# Patient Record
Sex: FEMALE | Race: WHITE | Employment: OTHER | URBAN - METROPOLITAN AREA
[De-identification: names, ages, dates, MRNs, and addresses within clinical notes are randomized per-mention and may not be internally consistent; named-entity substitution may affect disease eponyms.]

---

## 2018-01-29 PROBLEM — I82.5Z3 CHRONIC DEEP VEIN THROMBOSIS (DVT) OF DISTAL VEIN OF BOTH LOWER EXTREMITIES (HCC): Status: ACTIVE | Noted: 2018-01-29

## 2018-03-15 ENCOUNTER — HOSPITAL ENCOUNTER (OUTPATIENT)
Dept: CT IMAGING | Age: 78
Discharge: HOME | End: 2018-03-15
Attending: NURSE PRACTITIONER
Payer: MEDICARE

## 2018-03-15 DIAGNOSIS — R05.9 COUGH: ICD-10-CM

## 2018-03-15 DIAGNOSIS — R53.1 WEAKNESS: ICD-10-CM

## 2018-03-15 DIAGNOSIS — J18.9 PNEUMONIA OF RIGHT LOWER LOBE DUE TO INFECTIOUS ORGANISM: ICD-10-CM

## 2018-03-15 DIAGNOSIS — R21 RASH: ICD-10-CM

## 2018-03-15 DIAGNOSIS — Z85.3 HISTORY OF RIGHT BREAST CANCER: ICD-10-CM

## 2018-03-15 DIAGNOSIS — E87.1 HYPONATREMIA: ICD-10-CM

## 2018-03-15 LAB — CREAT BLD-MCNC: 0.6 MG/DL (ref 0.8–1.5)

## 2018-03-15 PROCEDURE — 71260 CT THORAX DX C+: CPT

## 2018-03-15 PROCEDURE — 74011000258 HC RX REV CODE- 258

## 2018-03-15 PROCEDURE — 74011636320 HC RX REV CODE- 636/320

## 2018-03-15 PROCEDURE — 82565 ASSAY OF CREATININE: CPT

## 2018-03-15 RX ORDER — SODIUM CHLORIDE 0.9 % (FLUSH) 0.9 %
10 SYRINGE (ML) INJECTION
Status: COMPLETED | OUTPATIENT
Start: 2018-03-15 | End: 2018-03-15

## 2018-03-15 RX ADMIN — Medication 10 ML: at 14:49

## 2018-03-15 RX ADMIN — SODIUM CHLORIDE 100 ML: 900 INJECTION, SOLUTION INTRAVENOUS at 14:49

## 2018-03-15 RX ADMIN — IOPAMIDOL 100 ML: 755 INJECTION, SOLUTION INTRAVENOUS at 14:49

## 2018-03-16 NOTE — PROGRESS NOTES
No pulmonary embolism. Bilateral infiltrates and cardiomegaly - fluid overload vs. CHF vs. Pneumonia. On Levaquin. Recheck vital signs and CBC/CMP tomorrow; BNP as well. Released to [address]

## 2018-07-30 PROBLEM — Z85.3 HISTORY OF LEFT BREAST CANCER: Status: ACTIVE | Noted: 2018-07-30

## 2018-08-01 ENCOUNTER — HOSPITAL ENCOUNTER (OUTPATIENT)
Dept: LAB | Age: 78
Discharge: HOME | End: 2018-08-01
Attending: INTERNAL MEDICINE
Payer: MEDICARE

## 2018-08-01 DIAGNOSIS — R00.2 PALPITATION: ICD-10-CM

## 2018-08-01 DIAGNOSIS — I47.1 SVT (SUPRAVENTRICULAR TACHYCARDIA) (HCC): ICD-10-CM

## 2018-08-01 LAB
ANION GAP SERPL CALC-SCNC: 5 MMOL/L
BASOPHILS # BLD: 0 K/UL (ref 0–0.2)
BASOPHILS NFR BLD: 1 % (ref 0–2)
BUN SERPL-MCNC: 15 MG/DL (ref 8–23)
CALCIUM SERPL-MCNC: 9 MG/DL (ref 8.3–10.4)
CHLORIDE SERPL-SCNC: 106 MMOL/L (ref 98–107)
CO2 SERPL-SCNC: 32 MMOL/L (ref 21–32)
CREAT SERPL-MCNC: 0.7 MG/DL (ref 0.6–1)
DIFFERENTIAL METHOD BLD: ABNORMAL
EOSINOPHIL # BLD: 0.2 K/UL (ref 0–0.8)
EOSINOPHIL NFR BLD: 3 % (ref 0.5–7.8)
ERYTHROCYTE [DISTWIDTH] IN BLOOD BY AUTOMATED COUNT: 14.1 % (ref 11.9–14.6)
GLUCOSE SERPL-MCNC: 97 MG/DL (ref 65–100)
HCT VFR BLD AUTO: 44.5 % (ref 35.8–46.3)
HGB BLD-MCNC: 14.5 G/DL (ref 11.7–15.4)
LYMPHOCYTES # BLD: 1.8 K/UL (ref 0.5–4.6)
LYMPHOCYTES NFR BLD: 28 % (ref 13–44)
MAGNESIUM SERPL-MCNC: 2.5 MG/DL (ref 1.8–2.4)
MCH RBC QN AUTO: 32.4 PG (ref 26.1–32.9)
MCHC RBC AUTO-ENTMCNC: 32.6 G/DL (ref 31.4–35)
MCV RBC AUTO: 99.3 FL (ref 79.6–97.8)
MONOCYTES # BLD: 0.6 K/UL (ref 0.1–1.3)
MONOCYTES NFR BLD: 9 % (ref 4–12)
NEUTS SEG # BLD: 3.8 K/UL (ref 1.7–8.2)
NEUTS SEG NFR BLD: 59 % (ref 43–78)
PLATELET # BLD AUTO: 167 K/UL (ref 150–450)
PMV BLD AUTO: 10.7 FL (ref 10.8–14.1)
POTASSIUM SERPL-SCNC: 4.1 MMOL/L (ref 3.5–5.1)
RBC # BLD AUTO: 4.48 M/UL (ref 4.05–5.25)
SODIUM SERPL-SCNC: 143 MMOL/L (ref 136–145)
WBC # BLD AUTO: 6.4 K/UL (ref 4.3–11.1)

## 2018-08-01 PROCEDURE — 36415 COLL VENOUS BLD VENIPUNCTURE: CPT | Performed by: INTERNAL MEDICINE

## 2018-08-01 PROCEDURE — 85025 COMPLETE CBC W/AUTO DIFF WBC: CPT | Performed by: INTERNAL MEDICINE

## 2018-08-01 PROCEDURE — 80048 BASIC METABOLIC PNL TOTAL CA: CPT | Performed by: INTERNAL MEDICINE

## 2018-08-01 PROCEDURE — 83735 ASSAY OF MAGNESIUM: CPT | Performed by: INTERNAL MEDICINE

## 2018-08-07 NOTE — PROGRESS NOTES
Called to pre-assess for EPS - SVT ablation with Dr Afshan Potter , Scheduled 8/8/18. No answer & message left.

## 2018-08-07 NOTE — PROGRESS NOTES
Patient pre-assessment complete for EPS- SVT ablation with DR Marion Lock scheduled for 18 at 12:30pm, arrival time 10:30am. Patient verified using . Patient instructed to bring all home medications in labeled bottles on the day of procedure. NPO status reinforced. Patient instructed to HOLD metoprolol x 1 day (last dose 18). Instructed they can take all other medications excluding vitamins & supplements. Patient verbalizes understanding of all instructions & denies any questions at this time.

## 2018-08-08 ENCOUNTER — ANESTHESIA EVENT (OUTPATIENT)
Dept: SURGERY | Age: 78
End: 2018-08-08
Payer: MEDICARE

## 2018-08-08 ENCOUNTER — APPOINTMENT (OUTPATIENT)
Dept: CARDIAC CATH/INVASIVE PROCEDURES | Age: 78
End: 2018-08-08
Payer: MEDICARE

## 2018-08-08 ENCOUNTER — ANESTHESIA (OUTPATIENT)
Dept: SURGERY | Age: 78
End: 2018-08-08
Payer: MEDICARE

## 2018-08-08 ENCOUNTER — APPOINTMENT (OUTPATIENT)
Dept: CARDIAC CATH/INVASIVE PROCEDURES | Age: 78
End: 2018-08-08
Attending: INTERNAL MEDICINE
Payer: MEDICARE

## 2018-08-08 ENCOUNTER — HOSPITAL ENCOUNTER (OUTPATIENT)
Age: 78
Setting detail: OUTPATIENT SURGERY
Discharge: HOME | End: 2018-08-08
Attending: INTERNAL MEDICINE | Admitting: INTERNAL MEDICINE
Payer: MEDICARE

## 2018-08-08 VITALS
HEART RATE: 82 BPM | RESPIRATION RATE: 21 BRPM | HEIGHT: 66 IN | OXYGEN SATURATION: 98 % | WEIGHT: 153 LBS | TEMPERATURE: 97.7 F | SYSTOLIC BLOOD PRESSURE: 124 MMHG | DIASTOLIC BLOOD PRESSURE: 77 MMHG | BODY MASS INDEX: 24.59 KG/M2

## 2018-08-08 PROBLEM — I47.1 SVT (SUPRAVENTRICULAR TACHYCARDIA) (HCC): Status: ACTIVE | Noted: 2018-08-08

## 2018-08-08 LAB
ANION GAP SERPL CALC-SCNC: 8 MMOL/L (ref 7–16)
ATRIAL RATE: 83 BPM
BUN SERPL-MCNC: 18 MG/DL (ref 8–23)
CALCIUM SERPL-MCNC: 8.9 MG/DL (ref 8.3–10.4)
CALCULATED P AXIS, ECG09: -12 DEGREES
CALCULATED R AXIS, ECG10: -33 DEGREES
CALCULATED T AXIS, ECG11: 10 DEGREES
CHLORIDE SERPL-SCNC: 109 MMOL/L (ref 98–107)
CO2 SERPL-SCNC: 28 MMOL/L (ref 21–32)
CREAT SERPL-MCNC: 0.61 MG/DL (ref 0.6–1)
DIAGNOSIS, 93000: NORMAL
ERYTHROCYTE [DISTWIDTH] IN BLOOD BY AUTOMATED COUNT: 14 %
GLUCOSE SERPL-MCNC: 113 MG/DL (ref 65–100)
HCT VFR BLD AUTO: 44 % (ref 35.8–46.3)
HGB BLD-MCNC: 14.3 G/DL (ref 11.7–15.4)
INR PPP: 1
MAGNESIUM SERPL-MCNC: 2.2 MG/DL (ref 1.8–2.4)
MCH RBC QN AUTO: 31.8 PG (ref 26.1–32.9)
MCHC RBC AUTO-ENTMCNC: 32.5 G/DL (ref 31.4–35)
MCV RBC AUTO: 97.8 FL (ref 79.6–97.8)
NRBC # BLD: 0 K/UL (ref 0–0.2)
P-R INTERVAL, ECG05: 162 MS
PLATELET # BLD AUTO: 148 K/UL (ref 150–450)
PMV BLD AUTO: 12.2 FL (ref 9.4–12.3)
POTASSIUM SERPL-SCNC: 3.8 MMOL/L (ref 3.5–5.1)
PROTHROMBIN TIME: 12.9 SEC (ref 11.5–14.5)
Q-T INTERVAL, ECG07: 390 MS
QRS DURATION, ECG06: 100 MS
QTC CALCULATION (BEZET), ECG08: 458 MS
RBC # BLD AUTO: 4.5 M/UL (ref 4.05–5.2)
SODIUM SERPL-SCNC: 145 MMOL/L (ref 136–145)
VENTRICULAR RATE, ECG03: 83 BPM
WBC # BLD AUTO: 6 K/UL (ref 4.3–11.1)

## 2018-08-08 PROCEDURE — 83735 ASSAY OF MAGNESIUM: CPT

## 2018-08-08 PROCEDURE — 80048 BASIC METABOLIC PNL TOTAL CA: CPT

## 2018-08-08 PROCEDURE — 76060000033 HC ANESTHESIA 1 TO 1.5 HR: Performed by: INTERNAL MEDICINE

## 2018-08-08 PROCEDURE — 77030027107 HC PTCH EXT REF CARTO3 J&J -F

## 2018-08-08 PROCEDURE — 74011250636 HC RX REV CODE- 250/636

## 2018-08-08 PROCEDURE — 93653 COMPRE EP EVAL TX SVT: CPT

## 2018-08-08 PROCEDURE — 77030035291 HC TBNG PMP SMARTABLATE J&J -B

## 2018-08-08 PROCEDURE — C1893 INTRO/SHEATH, FIXED,NON-PEEL: HCPCS

## 2018-08-08 PROCEDURE — C1732 CATH, EP, DIAG/ABL, 3D/VECT: HCPCS

## 2018-08-08 PROCEDURE — 85610 PROTHROMBIN TIME: CPT

## 2018-08-08 PROCEDURE — 93621 COMP EP EVL L PAC&REC C SINS: CPT

## 2018-08-08 PROCEDURE — 93005 ELECTROCARDIOGRAM TRACING: CPT | Performed by: INTERNAL MEDICINE

## 2018-08-08 PROCEDURE — 93613 INTRACARDIAC EPHYS 3D MAPG: CPT

## 2018-08-08 PROCEDURE — 76937 US GUIDE VASCULAR ACCESS: CPT

## 2018-08-08 PROCEDURE — 74011250636 HC RX REV CODE- 250/636: Performed by: ANESTHESIOLOGY

## 2018-08-08 PROCEDURE — C1733 CATH, EP, OTHR THAN COOL-TIP: HCPCS

## 2018-08-08 PROCEDURE — C1894 INTRO/SHEATH, NON-LASER: HCPCS

## 2018-08-08 PROCEDURE — 85027 COMPLETE CBC AUTOMATED: CPT

## 2018-08-08 PROCEDURE — 77030013687 HC GD NDL BARD -B

## 2018-08-08 PROCEDURE — C1730 CATH, EP, 19 OR FEW ELECT: HCPCS

## 2018-08-08 RX ORDER — HYDROMORPHONE HYDROCHLORIDE 2 MG/ML
0.5 INJECTION, SOLUTION INTRAMUSCULAR; INTRAVENOUS; SUBCUTANEOUS
Status: CANCELLED | OUTPATIENT
Start: 2018-08-08

## 2018-08-08 RX ORDER — LIDOCAINE HYDROCHLORIDE 20 MG/ML
INJECTION, SOLUTION EPIDURAL; INFILTRATION; INTRACAUDAL; PERINEURAL AS NEEDED
Status: DISCONTINUED | OUTPATIENT
Start: 2018-08-08 | End: 2018-08-08 | Stop reason: HOSPADM

## 2018-08-08 RX ORDER — MIDAZOLAM HYDROCHLORIDE 1 MG/ML
2 INJECTION, SOLUTION INTRAMUSCULAR; INTRAVENOUS
Status: DISCONTINUED | OUTPATIENT
Start: 2018-08-08 | End: 2018-08-08 | Stop reason: HOSPADM

## 2018-08-08 RX ORDER — SODIUM CHLORIDE 0.9 % (FLUSH) 0.9 %
5-10 SYRINGE (ML) INJECTION EVERY 8 HOURS
Status: DISCONTINUED | OUTPATIENT
Start: 2018-08-08 | End: 2018-08-08 | Stop reason: HOSPADM

## 2018-08-08 RX ORDER — PROPOFOL 10 MG/ML
INJECTION, EMULSION INTRAVENOUS
Status: DISCONTINUED | OUTPATIENT
Start: 2018-08-08 | End: 2018-08-08 | Stop reason: HOSPADM

## 2018-08-08 RX ORDER — SODIUM CHLORIDE, SODIUM LACTATE, POTASSIUM CHLORIDE, CALCIUM CHLORIDE 600; 310; 30; 20 MG/100ML; MG/100ML; MG/100ML; MG/100ML
100 INJECTION, SOLUTION INTRAVENOUS CONTINUOUS
Status: DISCONTINUED | OUTPATIENT
Start: 2018-08-08 | End: 2018-08-08 | Stop reason: HOSPADM

## 2018-08-08 RX ORDER — SODIUM CHLORIDE 0.9 % (FLUSH) 0.9 %
5-10 SYRINGE (ML) INJECTION AS NEEDED
Status: DISCONTINUED | OUTPATIENT
Start: 2018-08-08 | End: 2018-08-08 | Stop reason: HOSPADM

## 2018-08-08 RX ORDER — PROPOFOL 10 MG/ML
INJECTION, EMULSION INTRAVENOUS AS NEEDED
Status: DISCONTINUED | OUTPATIENT
Start: 2018-08-08 | End: 2018-08-08 | Stop reason: HOSPADM

## 2018-08-08 RX ORDER — SODIUM CHLORIDE 0.9 % (FLUSH) 0.9 %
5-10 SYRINGE (ML) INJECTION AS NEEDED
Status: CANCELLED | OUTPATIENT
Start: 2018-08-08

## 2018-08-08 RX ORDER — SODIUM CHLORIDE, SODIUM LACTATE, POTASSIUM CHLORIDE, CALCIUM CHLORIDE 600; 310; 30; 20 MG/100ML; MG/100ML; MG/100ML; MG/100ML
100 INJECTION, SOLUTION INTRAVENOUS CONTINUOUS
Status: CANCELLED | OUTPATIENT
Start: 2018-08-08 | End: 2018-08-08

## 2018-08-08 RX ORDER — LIDOCAINE HYDROCHLORIDE 10 MG/ML
0.3 INJECTION INFILTRATION; PERINEURAL ONCE
Status: DISCONTINUED | OUTPATIENT
Start: 2018-08-08 | End: 2018-08-08 | Stop reason: HOSPADM

## 2018-08-08 RX ORDER — OXYCODONE HYDROCHLORIDE 5 MG/1
10 TABLET ORAL
Status: CANCELLED | OUTPATIENT
Start: 2018-08-08 | End: 2018-08-09

## 2018-08-08 RX ADMIN — LIDOCAINE HYDROCHLORIDE 40 MG: 20 INJECTION, SOLUTION EPIDURAL; INFILTRATION; INTRACAUDAL; PERINEURAL at 11:59

## 2018-08-08 RX ADMIN — PROPOFOL 140 MCG/KG/MIN: 10 INJECTION, EMULSION INTRAVENOUS at 12:03

## 2018-08-08 RX ADMIN — PROPOFOL 50 MG: 10 INJECTION, EMULSION INTRAVENOUS at 11:59

## 2018-08-08 RX ADMIN — PROPOFOL 40 MG: 10 INJECTION, EMULSION INTRAVENOUS at 12:05

## 2018-08-08 RX ADMIN — PROPOFOL 40 MG: 10 INJECTION, EMULSION INTRAVENOUS at 12:00

## 2018-08-08 RX ADMIN — SODIUM CHLORIDE, SODIUM LACTATE, POTASSIUM CHLORIDE, AND CALCIUM CHLORIDE: 600; 310; 30; 20 INJECTION, SOLUTION INTRAVENOUS at 11:51

## 2018-08-08 NOTE — PROGRESS NOTES
Patient received to [address] [place] [address] room # 9  Ambulatory from [facility]. Patient scheduled for EPS today with Dr Perla Enriquez. Procedure reviewed & questions answered, voiced good understanding consent obtained & placed on chart. All medications and medical history reviewed. Will prep patient per orders. Patient & family updated on plan of care. The patient has a fraility score of 3-MANAGING WELL, based on independent of ADLs/ambulation.

## 2018-08-08 NOTE — IP AVS SNAPSHOT
[address] 
[phone] Patient: Noemi Davey MRN: [id number] EGC:[id number]/38/[id number] Discharge Summary 8/8/2018 Noemi Davey MRN[de-identified]  [id number] Admission Information Provider Pager Service Admission Date Expected D/C Date Slava Barahona MD  CARDIAC CATH LAB 8/8/2018 8/8/2018 Actual LOS Patient Class 0 days OUTPATIENT SURGERY Follow-up Information Follow up With Details Comments Contact Info Jocelin Dubois MD   [address] [place] [address] [address]  Pratik Marks 
[phone] My Medications TAKE these medications as instructed Instructions Each Dose to Equal  
 Morning Noon Evening Bedtime  
 anastrozole 1 mg tablet Commonly known as:  ARIMIDEX Your last dose was: Your next dose is: Take 1 Tab by mouth daily. 1 Tab  
    
   
   
   
  
 apixaban 2.5 mg tablet Commonly known as:  Donavon Croterrient Your last dose was: Your next dose is: Take 1 Tab by mouth two (2) times a day. Indications: deep venous thrombosis 2.5 mg  
    
   
   
   
  
 cholecalciferol 1,000 unit tablet Commonly known as:  VITAMIN D3 Your last dose was: Your next dose is: Take  by mouth daily. latanoprost 0.005 % ophthalmic solution Commonly known as:  Paralee Dicker Your last dose was: Your next dose is:    
   
   
 Administer 1 Drop to both eyes nightly. 1 Drop  
    
   
   
   
  
 lutein 20 mg Cap Your last dose was: Your next dose is: Take  by mouth daily. metoprolol tartrate 25 mg tablet Commonly known as:  LOPRESSOR Your last dose was: Your next dose is: Take 1 Tab by mouth two (2) times a day. 25 mg  
    
   
   
   
  
 multivitamin tablet Commonly known as:  ONE A DAY Your last dose was: Your next dose is: Take 1 Tab by mouth daily. 1 Tab  
    
   
   
   
  
 timolol 0.5 % ophthalmic solution Commonly known as:  TIMOPTIC Your last dose was: Your next dose is:    
   
   
 1 Drop two (2) times a day. 1 Drop General Information Please provide this summary of care documentation to your next provider. Allergies Unspecified:  Cephalexin Current Immunizations  Reviewed on 4/23/2018 Name Date Influenza High Dose Vaccine PF 12/1/2017, 9/22/2016 Influenza Vaccine PF 11/6/2015 Pneumococcal Conjugate (PCV-13) 1/29/2018 Pneumococcal Polysaccharide (PPSV-23) 6/21/2009 Td 4/15/2008 Zoster Vaccine, Live 4/15/2008 Discharge Instructions Discharge Instructions Follow-up appointment with Dr. Antoine Pérez on September 14th at 12:45 pm at the [place] office located on [facility]. If you need to reschedule your appointment, please call [facility] Cardiology at [phone]. DISCHARGE INSTRUCTIONS 1. Check puncture site frequently for swelling or bleeding. If there is any bleeding, lie down and apply pressure over the area with a clean towel or washcloth. Notify your doctor for any redness, swelling, drainage, or oozing from the puncture site. Notify your doctor for any fever or chills. 2. If the extremity becomes cold, numb, or painful call Dr. Antoine Pérez at [phone]. 
3. Activity should be limited for the next 48 hours. Climb stairs as little as possible and avoid any stooping, bending, or strenuous activity for 48 hours. No heavy lifting (anything over 10 pounds) for 3 days. 4. You may resume your usual diet. Drink more fluids than usual. 
5. Have a responsible person drive you home and stay with you for at least 24 hours after your heart catheterization/angiography. 6. You may remove bandage from your Right Groin in 24 hours. You may shower in 24 hours. No tub baths, hot tubs, or swimming for 1 week. Do not place any lotions, creams, powders, or ointments over puncture site for 1 week. You may place a clean band-aid over the puncture site each day for 5 days. Change daily. I have read the above instructions and have had the opportunity to ask questions. Patient: ________________________   Date: 8/8/2018 Witness: _______________________   Date: 8/8/2018 Discharge Orders None  
  
` Patient Signature:  ____________________________________________________________ Date:  ____________________________________________________________  
  
 Penelope Marco Provider Signature:  ____________________________________________________________ Date:  ____________________________________________________________ unknown

## 2018-08-08 NOTE — PROGRESS NOTES
Patient up to bedside, vital signs and site stable. Patient ambulated to bathroom without difficulty. Patient voided without difficulty. Vascular site stable. Discharge instructions and home medications reviewed with patient. Time allowed for questions and answers. 1720 Patient ambulated second time without difficulty. Site stable after ambulation. Peripheral IV sites dc'd without difficulty with tips intact. [phone] Patient discharged to home with family.

## 2018-08-08 NOTE — PROGRESS NOTES
Report received from [facility]. Procedural findings communicated. Intra procedural  medication administration reviewed. Progression of care discussed.      Patient received into [id number] [facility] 5 post sheath removal.     Access site without bleeding or swelling yes    Dressing dry and intact yes    Patient instructed to limit movement to right lower extremity    Routine post procedural vital signs and site assessment initiated yes

## 2018-08-08 NOTE — ANESTHESIA PREPROCEDURE EVALUATION
Anesthetic History   No history of anesthetic complications            Review of Systems / Medical History  Patient summary reviewed and pertinent labs reviewed    Pulmonary  Within defined limits                 Neuro/Psych   Within defined limits           Cardiovascular            Dysrhythmias : SVT      Exercise tolerance: >4 METS  Comments:  On eliquis for DVTs, echo with normal EF in 2013   GI/Hepatic/Renal  Within defined limits              Endo/Other  Within defined limits           Other Findings            Physical Exam    Airway  Mallampati: II  TM Distance: < 4 cm  Neck ROM: normal range of motion, short neck   Mouth opening: Normal     Cardiovascular    Rhythm: regular  Rate: normal         Dental  No notable dental hx       Pulmonary  Breath sounds clear to auscultation               Abdominal         Other Findings            Anesthetic Plan    ASA: 2  Anesthesia type: total IV anesthesia          Induction: Intravenous  Anesthetic plan and risks discussed with: Patient

## 2018-08-08 NOTE — DISCHARGE INSTRUCTIONS
Follow-up appointment with Dr. Harper Velázquez on September 14th at 12:45 pm at the [place] office located on [facility]. If you need to reschedule your appointment, please call [facility] Cardiology at [phone]. DISCHARGE INSTRUCTIONS    1. Check puncture site frequently for swelling or bleeding. If there is any bleeding, lie down and apply pressure over the area with a clean towel or washcloth. Notify your doctor for any redness, swelling, drainage, or oozing from the puncture site. Notify your doctor for any fever or chills. 2. If the extremity becomes cold, numb, or painful call Dr. Harper Velázquez at [phone].  3. Activity should be limited for the next 48 hours. Climb stairs as little as possible and avoid any stooping, bending, or strenuous activity for 48 hours. No heavy lifting (anything over 10 pounds) for 3 days. 4. You may resume your usual diet. Drink more fluids than usual.  5. Have a responsible person drive you home and stay with you for at least 24 hours after your heart catheterization/angiography. 6. You may remove bandage from your Right Groin in 24 hours. You may shower in 24 hours. No tub baths, hot tubs, or swimming for 1 week. Do not place any lotions, creams, powders, or ointments over puncture site for 1 week. You may place a clean band-aid over the puncture site each day for 5 days. Change daily. I have read the above instructions and have had the opportunity to ask questions.       Patient: ________________________   Date: 8/8/2018    Witness: _______________________   Date: 8/8/2018

## 2018-08-08 NOTE — ANESTHESIA POSTPROCEDURE EVALUATION
Post-Anesthesia Evaluation and Assessment    Patient: Noah Pierce MRN: [id number]  SSN: [id number]    YOB: 1940  Age: 69 y.o. Sex: female       Cardiovascular Function/Vital Signs  Visit Vitals    /70    Pulse 79    Temp 36.5 °C (97.7 °F)    Resp 23    Ht 5' 6\" (1.676 m)    Wt 69.4 kg (153 lb)    SpO2 100%    Breastfeeding No    BMI 24.69 kg/m2       Patient is status post total IV anesthesia anesthesia for Procedure(s):  EP STUDY / SVT  ABLATION. Nausea/Vomiting: None    Postoperative hydration reviewed and adequate. Pain:  Pain Scale 1: Numeric (0 - 10) (08/08/18 1123)  Pain Intensity 1: 0 (08/08/18 1123)   Managed    Neurological Status: At baseline    Mental Status and Level of Consciousness: Arousable    Pulmonary Status:   O2 Device: Room air (08/08/18 1320)   Adequate oxygenation and airway patent    Complications related to anesthesia: None    Post-anesthesia assessment completed.  No concerns    Signed By: Cong Adan MD     August 8, 2018

## 2018-08-08 NOTE — PROCEDURES
Pre-Electrophysiology Diagnosis  1. Supraventricular tachycardia     Procedure Performed  1. Comprehensive EP Study  2. Ablation of a supraventricular tachycardia  3. Left atrial pacing recording from the coronary sinus. 4. 3-D Electroanatomical mapping    Anesthesia: MAC     Estimated Blood Loss: Less than 10 mL     Specimens: * No specimens in log *     Procedure: The patient was brought to the electrophysiology lab in the fasting state. The patient was then prepped and draped in sterile fashion. 1% local sensorcaine was infiltrated into the subcutaneous tissues in the right inguinal crease overlying the right femoral vein. Venous access was then obtained x3 using modified Seldinger technique under ultrasound guidance with placement of two 8Fr sheaths, and an 7Fr short sidearm sheath. A decapolar CS catheter was inserted via the 8Fr sheath and positioned in the mid coronary sinus. A quadripolar cathters were inserted via the 7Fr sheath and positioned in the RV and His position. A comprehensive EP study was performed with attempted arrhythmia induction (see measurements of intra-cardiac conduction and induced SVT description below). Post ablation, His recordings were again obtained and attempted induction of arrhythmia was performed with burst CS pacing and single and double extra stimuli. Tachycardia description and diagnostics: At baseline, with PES the patient consistently went into a narrow complex SVT with a short VA time of 36 msec with a TCL of 360 ms. The SVT was induced with a very short VA time of about 36 msec and with ventricular entrainment produced an V-A-H-V response with a long PPI interval consistent and diagnostic of AVNRT. CARTO was used to create an impedence map of the anatomical region of the slow pathway, including the His and ostium of the coronary sinus.   RF energy was delivered in sinus rhythm in the region of the slow pathway with a small atrial and larger ventricular signal with several beats of a slow junctional rhythm. Post ablation, on isuprel aggressive burst pacing and PES were unable to re-induce the clinical SVT. In addition, with 1:1 AV pacing the patient did not cross over to the slow pathway consistently developing Wencheback prior to cross. With PES, there was no jump to the slow pathway or echo beats post ablation. Baseline Intervals    QRS duration: 86 msec  IN interval: 176 msec  RR interval: 584 msec  AH interval: 77 msec  HV interval: 49 msec    EP Study    AV Wenchebach: 340 msec  AV tab ERP: 300 msec  VA Wen[place]bach: 350 msec    Tachycardia Cycle Length: 360 msec    At the completion of the final comprehensive EP study, all catheters were removed after placement of a figure 8 stitch, and sheaths pulled. The patient tolerated the procedure well with no acute complications recognized. Post Procedure Diagnosis: successful ablation of AVNRT    Summary:   1. Comprehensive EP study with induction of an SVT. 2. Successful ablation of ANVRT. 3. Pt tolerated the procedure well. 4. Family updated. Sissy Alan MD, [address]  Clinical Cardiac Electrophysiology  8/8/2018  1:02 PM

## 2018-08-08 NOTE — H&P
[facility] Cardiology/Electrophyiology Consult                Date of  Admission: 8/8/2018 10:14 AM       CC/Reason for admission: EPS and SVT    Debi Ritchie is a 69 y.o. female admitted for Antidromic reciprocating tachycardia ([facility] [address] [I47.1]. 69 y.o. female with a past medical and cardiac history significant for recently diagnosed SVT and presents for EPS. Pt reports she has had years of frequent rapid palpitations usually abrupt onset and offset worsened with activity lasting up to several hours with symptoms of SOB and fatigue. PT SVT is a new diagnosis, no aggravating or alleviating factors, with symptoms as noted above.      Cardiac PMH: (Old records have been reviewed and summarized below)  Monitor (5/20/18): SVT, rate 205    Patient Active Problem List   Diagnosis Code    History of right breast cancer Z85.3    Glaucoma H40.9    Abnormal EKG R94.31    Chronic deep vein thrombosis (DVT) of distal vein of both lower extremities (HCC) I82.5Z3    History of left breast cancer Z85.3    SVT (supraventricular tachycardia) ([facility]) I47.1       Past Medical History:   Diagnosis Date    Arrhythmia     Arthritis     Cancer ([facility] [address]     breast cancer x 2    Diverticulosis     DVT of lower extremity (deep venous thrombosis) ([facility] [address]     left post op 1996    Glaucoma     History of right breast cancer     Hx of breast cancer 2016    left    Hx of echocardiogram 06/28/2103    mild MVP,mild LAE, EF of 58%    Osteopenia       Past Surgical History:   Procedure Laterality Date    HX BREAST BIOPSY Left 08/02/2016    left breast cancer-grade 1 invasive ductal cancer    HX COLONOSCOPY  05/07/2009    Dr. Hannah Carrillo- Diverticulosis and rectal polyp    HX HERNIA REPAIR Right     inguinal    HX MASTECTOMY Right 09/2016     Allergies   Allergen Reactions    Cephalexin Rash      Family History   Problem Relation Age of Onset    Breast Cancer Mother 28    Cancer Father 76    Heart Disease Paternal Grandmother  Diabetes Maternal Grandmother     Prostate Cancer Brother     Cancer Sister      thyroid    Breast Cancer Paternal Aunt     Cancer Paternal Aunt      stomach        Current Facility-Administered Medications   Medication Dose Route Frequency    lidocaine (XYLOCAINE) 10 mg/mL (1 %) injection 0.3 mL  0.3 mL SubCUTAneous ONCE    lactated Ringers infusion  100 mL/hr IntraVENous CONTINUOUS    sodium chloride (NS) flush 5-10 mL  5-10 mL IntraVENous Q8H    sodium chloride (NS) flush 5-10 mL  5-10 mL IntraVENous PRN    midazolam (VERSED) injection 2 mg  2 mg IntraVENous ONCE PRN       Review of Symptoms:  A comprehensive ROS was performed with the pertinent positives and negatives mentioned in the HPI, all other systems reviewed and are negative       Physical Exam  Vitals:    08/08/18 1123   BP: (!) 163/94   Pulse: 100   Resp: 18   Temp: 98 °F (36.7 °C)   SpO2: 98%   Weight: 69.4 kg (153 lb)   Height: 5' 6\" (1.676 m)       Physical Exam:  Gen: well appearing, well developed, NAD  Eyes: Pupils equal, EOMI  ENT: oropharynx clear, no oral lesions, normal dentition  CV: RRR, no M/R/G, PMI not palpable, normal JVD, no carotid bruits, normal distal pulses, no RADHA  Pulm: CTAB, no accessory muscle uses, no wheezes, crackles  GI: soft, NT, ND      Cardiographics    Telemetry:   ECG (Indpendently visualized and interpreted):  Echocardiogram:     Labs: No results for input(s): NA, K, MG, BUN, CREA, GLU, WBC, HGB, HCT, PLT, INR, TRIGL, LDL, HDL, HGBEXT, HCTEXT, PLTEXT, HGBEXT, HCTEXT, PLTEXT in the last 72 hours. No lab exists for component: TROPI, TCHOL, INREXT, INREXT     Assessment:      Active Problems:    SVT (supraventricular tachycardia) ([address] (8/8/2018)           Plan:   1. SVT, new diagnosis: admission for planned EPS and possible SVT ablation, answered all questions and concerns and Pt wishes to proceed      Shantel Alan MD, MS  Cardiology/Electrophysiology

## 2019-03-18 PROBLEM — I10 ESSENTIAL HYPERTENSION: Status: ACTIVE | Noted: 2019-03-18

## 2019-07-29 PROBLEM — M85.80 OSTEOPENIA: Status: ACTIVE | Noted: 2019-07-29

## 2021-03-17 PROBLEM — I47.1 SVT (SUPRAVENTRICULAR TACHYCARDIA) (HCC): Status: RESOLVED | Noted: 2018-08-08 | Resolved: 2021-03-17

## 2022-03-18 PROBLEM — M85.80 OSTEOPENIA: Status: ACTIVE | Noted: 2019-07-29

## 2022-03-19 PROBLEM — Z85.3 HISTORY OF LEFT BREAST CANCER: Status: ACTIVE | Noted: 2018-07-30

## 2022-03-19 PROBLEM — I82.5Z3 CHRONIC DEEP VEIN THROMBOSIS (DVT) OF DISTAL VEIN OF BOTH LOWER EXTREMITIES (HCC): Status: ACTIVE | Noted: 2018-01-29

## 2022-03-20 PROBLEM — I10 ESSENTIAL HYPERTENSION: Status: ACTIVE | Noted: 2019-03-18

## 2022-03-23 PROBLEM — I83.023 VENOUS STASIS ULCER OF LEFT ANKLE LIMITED TO BREAKDOWN OF SKIN (HCC): Status: ACTIVE | Noted: 2022-03-23

## 2022-03-23 PROBLEM — L97.321 VENOUS STASIS ULCER OF LEFT ANKLE LIMITED TO BREAKDOWN OF SKIN (HCC): Status: ACTIVE | Noted: 2022-03-23

## 2022-03-24 PROBLEM — L97.321 VENOUS STASIS ULCER OF LEFT ANKLE LIMITED TO BREAKDOWN OF SKIN (HCC): Status: ACTIVE | Noted: 2022-03-23

## 2022-03-24 PROBLEM — I83.023 VENOUS STASIS ULCER OF LEFT ANKLE LIMITED TO BREAKDOWN OF SKIN (HCC): Status: ACTIVE | Noted: 2022-03-23

## 2022-07-11 RX ORDER — ALENDRONATE SODIUM 70 MG/1
TABLET ORAL
Qty: 12 TABLET | Refills: 0 | Status: SHIPPED | OUTPATIENT
Start: 2022-07-11 | End: 2022-10-05

## 2022-08-18 ENCOUNTER — OFFICE VISIT (OUTPATIENT)
Dept: INTERNAL MEDICINE CLINIC | Facility: CLINIC | Age: 82
End: 2022-08-18
Payer: MEDICARE

## 2022-08-18 VITALS
SYSTOLIC BLOOD PRESSURE: 124 MMHG | BODY MASS INDEX: 26.94 KG/M2 | TEMPERATURE: 96.8 F | HEART RATE: 84 BPM | OXYGEN SATURATION: 98 % | HEIGHT: 64 IN | DIASTOLIC BLOOD PRESSURE: 80 MMHG | WEIGHT: 157.8 LBS

## 2022-08-18 DIAGNOSIS — I82.5Z3 CHRONIC EMBOLISM AND THROMBOSIS OF UNSPECIFIED DEEP VEINS OF DISTAL LOWER EXTREMITY, BILATERAL (HCC): Chronic | ICD-10-CM

## 2022-08-18 DIAGNOSIS — I10 ESSENTIAL (PRIMARY) HYPERTENSION: Chronic | ICD-10-CM

## 2022-08-18 DIAGNOSIS — C50.812 MALIGNANT NEOPLASM OF OVERLAPPING SITES OF LEFT FEMALE BREAST, UNSPECIFIED ESTROGEN RECEPTOR STATUS (HCC): ICD-10-CM

## 2022-08-18 DIAGNOSIS — D68.59 OTHER PRIMARY THROMBOPHILIA (HCC): ICD-10-CM

## 2022-08-18 DIAGNOSIS — I49.9 IRREGULAR HEART RATE: Primary | ICD-10-CM

## 2022-08-18 DIAGNOSIS — Z86.79 HISTORY OF SUPRAVENTRICULAR TACHYCARDIA: Chronic | ICD-10-CM

## 2022-08-18 PROCEDURE — G8417 CALC BMI ABV UP PARAM F/U: HCPCS | Performed by: NURSE PRACTITIONER

## 2022-08-18 PROCEDURE — G8400 PT W/DXA NO RESULTS DOC: HCPCS | Performed by: NURSE PRACTITIONER

## 2022-08-18 PROCEDURE — 1036F TOBACCO NON-USER: CPT | Performed by: NURSE PRACTITIONER

## 2022-08-18 PROCEDURE — 1123F ACP DISCUSS/DSCN MKR DOCD: CPT | Performed by: NURSE PRACTITIONER

## 2022-08-18 PROCEDURE — G8427 DOCREV CUR MEDS BY ELIG CLIN: HCPCS | Performed by: NURSE PRACTITIONER

## 2022-08-18 PROCEDURE — 99214 OFFICE O/P EST MOD 30 MIN: CPT | Performed by: NURSE PRACTITIONER

## 2022-08-18 PROCEDURE — 1090F PRES/ABSN URINE INCON ASSESS: CPT | Performed by: NURSE PRACTITIONER

## 2022-08-18 PROCEDURE — 93000 ELECTROCARDIOGRAM COMPLETE: CPT | Performed by: NURSE PRACTITIONER

## 2022-08-18 ASSESSMENT — PATIENT HEALTH QUESTIONNAIRE - PHQ9
SUM OF ALL RESPONSES TO PHQ9 QUESTIONS 1 & 2: 0
SUM OF ALL RESPONSES TO PHQ QUESTIONS 1-9: 0
2. FEELING DOWN, DEPRESSED OR HOPELESS: 0
SUM OF ALL RESPONSES TO PHQ QUESTIONS 1-9: 0
SUM OF ALL RESPONSES TO PHQ QUESTIONS 1-9: 0
1. LITTLE INTEREST OR PLEASURE IN DOING THINGS: 0
SUM OF ALL RESPONSES TO PHQ QUESTIONS 1-9: 0

## 2022-08-18 NOTE — PROGRESS NOTES
Robina Carmichael (: 1940) presents today c/o irregular heartbeat two days ago lasting about 30 minutes. She didn't feel weak or dizzy; no chest pain. She has hx/o SVT in 2018 and is s/p ablation by Dr. Brandi Madison. She is taking atenolol 25mg daily. Chief Complaint   Patient presents with    Irregular Heart Beat      Reviewed and updated this visit by provider:  Tobacco  Allergies  Meds  Problems  Med Hx  Surg Hx  Fam Hx       Immunizations:  Immunization status: up to date and documented. Review of Systems - Negative except as stated in HPI  History obtained from chart review and the patient  General ROS: negative  Respiratory ROS: no cough, shortness of breath, or wheezing  Cardiovascular ROS: positive for - irregular heartbeat and palpitations (now resolved)  Negative for - chest pain or dyspnea on exertion    Visit Vitals  /80 (Site: Left Upper Arm, Position: Sitting)   Pulse 84   Temp 96.8 °F (36 °C) (Temporal)   Ht 5' 4\" (1.626 m)   Wt 157 lb 12.8 oz (71.6 kg)   SpO2 98%   BMI 27.09 kg/m²     Physical Examination: General appearance - alert, well appearing, and in no distress, oriented to person, place, and time, and normal appearing weight  Mental status - alert, oriented to person, place, and time, normal mood, behavior, speech, dress, motor activity, and thought processes, affect appropriate to mood  Chest - clear to auscultation, no wheezes, rales or rhonchi, symmetric air entry  Heart - normal rate, regular rhythm, normal S1, S2, no murmurs, rubs, clicks or gallops  Extremities - peripheral pulses normal, no pedal edema, no clubbing or cyanosis    EKG: NSR; rate 76. No ectopy or ST-T wave changes. Unchanged in comparison to 2018. Reviewed by Dr. Tere Rosales. Assessment/Plan:  1. Irregular heart rate    2. Essential (primary) hypertension    3. Other primary thrombophilia ([facility] [address]    4.  Chronic embolism and thrombosis of unspecified deep veins of distal lower extremity, bilateral ([facility] [address]    5. Malignant neoplasm of overlapping sites of left female breast, unspecified estrogen receptor status ([facility] [address]      Orders Placed This Encounter   Procedures    [facility] - Negra Rojo MD, Cardiology, [facility]     Referral Priority:   Routine     Referral Type:   Eval and Treat     Referral Reason:   Specialty Services Required     Referred to Provider:   Valentino Pimenta, MD     Requested Specialty:   Cardiology     Number of Visits Requested:   1    EKG 12 Lead     Order Specific Question:   Reason for Exam?     Answer:   Irregular heart rate     EKG in office - no acute findings. Given the extensive length of her irregular heart rate and hx/o SVT, referral for consult with Dr. Diallo Kam (cardiology) placed. For now, continue current medications. I have instructed her to go to ER or call 911 with recurrent symptoms or symptoms such as chest pain, feeling faint, clammy, dizzy. She is agreeable.     Nadiya Bermudez NP, APRN - CNP

## 2022-09-20 ENCOUNTER — INITIAL CONSULT (OUTPATIENT)
Dept: CARDIOLOGY CLINIC | Age: 82
End: 2022-09-20
Payer: MEDICARE

## 2022-09-20 VITALS
WEIGHT: 161 LBS | HEART RATE: 100 BPM | DIASTOLIC BLOOD PRESSURE: 74 MMHG | SYSTOLIC BLOOD PRESSURE: 128 MMHG | HEIGHT: 64 IN | BODY MASS INDEX: 27.49 KG/M2

## 2022-09-20 DIAGNOSIS — R00.0 TACHYCARDIA: ICD-10-CM

## 2022-09-20 DIAGNOSIS — I34.1 MITRAL VALVE PROLAPSE: ICD-10-CM

## 2022-09-20 DIAGNOSIS — I10 ESSENTIAL HYPERTENSION: Primary | ICD-10-CM

## 2022-09-20 PROCEDURE — 99204 OFFICE O/P NEW MOD 45 MIN: CPT | Performed by: INTERNAL MEDICINE

## 2022-09-20 PROCEDURE — G8417 CALC BMI ABV UP PARAM F/U: HCPCS | Performed by: INTERNAL MEDICINE

## 2022-09-20 PROCEDURE — 1090F PRES/ABSN URINE INCON ASSESS: CPT | Performed by: INTERNAL MEDICINE

## 2022-09-20 PROCEDURE — 1123F ACP DISCUSS/DSCN MKR DOCD: CPT | Performed by: INTERNAL MEDICINE

## 2022-09-20 PROCEDURE — 1036F TOBACCO NON-USER: CPT | Performed by: INTERNAL MEDICINE

## 2022-09-20 PROCEDURE — G8428 CUR MEDS NOT DOCUMENT: HCPCS | Performed by: INTERNAL MEDICINE

## 2022-09-20 PROCEDURE — G8400 PT W/DXA NO RESULTS DOC: HCPCS | Performed by: INTERNAL MEDICINE

## 2022-09-20 RX ORDER — ANASTROZOLE 1 MG/1
TABLET ORAL
COMMUNITY
Start: 2022-09-17

## 2022-09-20 ASSESSMENT — ENCOUNTER SYMPTOMS
EYE PAIN: 0
APHONIA: 0
STRIDOR: 0
NAIL CHANGES: 0
ABDOMINAL PAIN: 0
COUGH: 0

## 2022-09-20 NOTE — PROGRESS NOTES
[address], [address] [employer] [facility], [address] [place] [address]  PHONE: [phone]    SUBJECTIVE:   Marilou Awan is a 81 y.o. female 1940   seen for a consultation visit regarding the following:     Chief Complaint   Patient presents with    Consultation      Irregular heart rate, hypertension              Consultation is requested by Virginia Carroll MD  for evaluation of Consultation ( Irregular heart rate, hypertension)   . History of present illness: 81 y.o. female history of SVT ablation 2018 seen recently by primary care provider with complaints of palpitations taking atenolol palpitations in August and last week. Provolked DVT     Cardiac History:  2018 ablation of supraventricular tachycardia  2018 echocardiogram normal left ventricular systolic function mitral valve prolapse mild MR      Assessment:  Tachycardia  Prn monitor discussed   Hypertension  Key CAD CHF Meds            apixaban (ELIQUIS) 2.5 MG TABS tablet (Taking)    Class: Historical Med    atenolol (TENORMIN) 25 MG tablet (Taking)    Class: Historical Med          History of DVT  On AC long term   History of mitral valve prolapse    Past Medical History, Past Surgical History, Family history, Social History, and Medications were all reviewed with the patient today and updated as necessary.        Allergies   Allergen Reactions    Cephalexin Rash     Past Medical History:   Diagnosis Date    Arthritis     Cancer ([address]     breast cancer x 2    Diverticulosis     DVT of lower extremity (deep venous thrombosis) ([address]     left post op 1996-long term tx with Eliquis    Essential hypertension 3/18/2019    Glaucoma 2018    Revision eye care    Hearing loss in left ear     History of right breast cancer     History of supraventricular tachycardia 2018    s/p Ablation    Hx of bone density study 09/25/2020    osteopenia    Hx of breast cancer 2016    left    Hx of complete eye exam 2019    due every 3mos- next appt May 7- Dr. Rosalia Guerrero    Hx of echocardiogram 06/28/2103    mild MVP,mild LAE, EF of 58%    Osteopenia 09/10/2018    by Bone Density     Past Surgical History:   Procedure Laterality Date    BREAST BIOPSY Left 08/02/2016    left breast cancer-grade 1 invasive ductal cancer    CARDIAC ELECTROPHYSIOLOGY MAPPING AND ABLATION  08/2018    Dr. Raimundo Richards; SVT    COLONOSCOPY  05/07/2009    Dr. Mitesh Barba- Diverticulosis and rectal polyp    HERNIA REPAIR Right     inguinal    MASTECTOMY Right 09/2016     Family History   Problem Relation Age of Onset    Breast Cancer Mother 28    Cancer Father 76    Heart Disease Paternal Grandmother     Diabetes Maternal Grandmother     Prostate Cancer Brother     Cancer Sister         thyroid    Breast Cancer Paternal Aunt     Cancer Paternal Aunt         stomach     Social History     Tobacco Use    Smoking status: Never     Passive exposure: Yes    Smokeless tobacco: Never   Substance Use Topics    Alcohol use: Yes     Alcohol/week: 1.0 standard drink       ROS:    Review of Systems   Constitutional: Negative for fever. HENT:  Negative for stridor. Eyes:  Negative for pain. Cardiovascular:  Negative for chest pain. Respiratory:  Negative for cough. Endocrine: Negative for cold intolerance. Skin:  Negative for nail changes. Musculoskeletal:  Negative for arthritis. Gastrointestinal:  Negative for abdominal pain. Genitourinary:  Negative for dysuria. Neurological:  Negative for aphonia. Psychiatric/Behavioral:  Negative for altered mental status. Allergic/Immunologic: Negative for hives. PHYSICAL EXAM:   /74   Pulse 100   Ht 5' 4\" (1.626 m)   Wt 161 lb (73 kg)   BMI 27.64 kg/m²      Physical Exam  Vitals reviewed. HENT:      Head: Normocephalic. Right Ear: External ear normal.      Left Ear: External ear normal.      Nose: Nose normal.   Eyes:      General: No scleral icterus.   Pulmonary:      Effort: Pulmonary effort is normal.   Abdominal:      General: There is no distension. Musculoskeletal:      Cervical back: Neck supple. Skin:     General: Skin is warm. Neurological:      Mental Status: She is alert. Mental status is at baseline. Medical problems and test results were reviewed with the patient today. No results found for any visits on 09/20/22. No results found for this or any previous visit (from the past 672 hour(s)). No results found for: CHOL, CHOLPOCT, CHOLX, CHLST, CHOLV, HDL, HDLPOC, HDLC, LDL, LDLC, VLDLC, VLDL, TGLX, TRIGL    No results found for any visits on 09/20/22. Carlos Toro was seen today for consultation. Diagnoses and all orders for this visit:    Essential hypertension    Tachycardia    Mitral valve prolapse    Return in about 1 year (around 9/20/2023). Thank you for allowing me to participate in this patient's care. Please call or contact me if there are any questions or concerns regarding the above.       Maggie Jones MD  09/20/22  2:52 PM      Proofread, but unrecognized errors may exist.

## 2022-10-04 ENCOUNTER — NURSE ONLY (OUTPATIENT)
Dept: INTERNAL MEDICINE CLINIC | Facility: CLINIC | Age: 82
End: 2022-10-04
Payer: MEDICARE

## 2022-10-04 DIAGNOSIS — Z23 NEED FOR INFLUENZA VACCINATION: Primary | ICD-10-CM

## 2022-10-04 PROCEDURE — 90694 VACC AIIV4 NO PRSRV 0.5ML IM: CPT | Performed by: INTERNAL MEDICINE

## 2022-10-04 PROCEDURE — G0008 ADMIN INFLUENZA VIRUS VAC: HCPCS | Performed by: INTERNAL MEDICINE

## 2022-10-05 RX ORDER — ALENDRONATE SODIUM 70 MG/1
TABLET ORAL
Qty: 12 TABLET | Refills: 0 | Status: SHIPPED | OUTPATIENT
Start: 2022-10-05

## 2022-10-05 NOTE — TELEPHONE ENCOUNTER
Requested Prescriptions     Pending Prescriptions Disp Refills    alendronate (FOSAMAX) 70 MG tablet [Pharmacy Med Name: Alendronate Sodium 70 MG Oral Tablet] 12 tablet 0     Sig: Take 1 tablet by mouth once a week

## 2022-10-10 RX ORDER — APIXABAN 2.5 MG/1
TABLET, FILM COATED ORAL
Qty: 180 TABLET | Refills: 2 | Status: SHIPPED | OUTPATIENT
Start: 2022-10-10

## 2022-10-12 ENCOUNTER — OFFICE VISIT (OUTPATIENT)
Dept: INTERNAL MEDICINE CLINIC | Facility: CLINIC | Age: 82
End: 2022-10-12
Payer: MEDICARE

## 2022-10-12 VITALS
WEIGHT: 161 LBS | HEART RATE: 89 BPM | OXYGEN SATURATION: 98 % | SYSTOLIC BLOOD PRESSURE: 110 MMHG | DIASTOLIC BLOOD PRESSURE: 70 MMHG | HEIGHT: 64 IN | BODY MASS INDEX: 27.49 KG/M2

## 2022-10-12 DIAGNOSIS — I10 ESSENTIAL HYPERTENSION: ICD-10-CM

## 2022-10-12 DIAGNOSIS — C50.812 MALIGNANT NEOPLASM OF OVERLAPPING SITES OF LEFT FEMALE BREAST, UNSPECIFIED ESTROGEN RECEPTOR STATUS (HCC): ICD-10-CM

## 2022-10-12 DIAGNOSIS — M85.80 OSTEOPENIA, UNSPECIFIED LOCATION: ICD-10-CM

## 2022-10-12 DIAGNOSIS — Z86.79 HISTORY OF SUPRAVENTRICULAR TACHYCARDIA: ICD-10-CM

## 2022-10-12 DIAGNOSIS — I82.5Z3 CHRONIC DEEP VEIN THROMBOSIS (DVT) OF DISTAL VEIN OF BOTH LOWER EXTREMITIES (HCC): Primary | ICD-10-CM

## 2022-10-12 DIAGNOSIS — R00.2 INTERMITTENT PALPITATIONS: ICD-10-CM

## 2022-10-12 PROBLEM — I83.023 VENOUS STASIS ULCER OF LEFT ANKLE LIMITED TO BREAKDOWN OF SKIN (HCC): Status: RESOLVED | Noted: 2022-03-23 | Resolved: 2022-10-12

## 2022-10-12 PROBLEM — L97.321 VENOUS STASIS ULCER OF LEFT ANKLE LIMITED TO BREAKDOWN OF SKIN (HCC): Status: RESOLVED | Noted: 2022-03-23 | Resolved: 2022-10-12

## 2022-10-12 PROCEDURE — 1090F PRES/ABSN URINE INCON ASSESS: CPT | Performed by: INTERNAL MEDICINE

## 2022-10-12 PROCEDURE — G8427 DOCREV CUR MEDS BY ELIG CLIN: HCPCS | Performed by: INTERNAL MEDICINE

## 2022-10-12 PROCEDURE — G8400 PT W/DXA NO RESULTS DOC: HCPCS | Performed by: INTERNAL MEDICINE

## 2022-10-12 PROCEDURE — 1036F TOBACCO NON-USER: CPT | Performed by: INTERNAL MEDICINE

## 2022-10-12 PROCEDURE — 1123F ACP DISCUSS/DSCN MKR DOCD: CPT | Performed by: INTERNAL MEDICINE

## 2022-10-12 PROCEDURE — 93000 ELECTROCARDIOGRAM COMPLETE: CPT | Performed by: INTERNAL MEDICINE

## 2022-10-12 PROCEDURE — G8484 FLU IMMUNIZE NO ADMIN: HCPCS | Performed by: INTERNAL MEDICINE

## 2022-10-12 PROCEDURE — G8417 CALC BMI ABV UP PARAM F/U: HCPCS | Performed by: INTERNAL MEDICINE

## 2022-10-12 PROCEDURE — 99214 OFFICE O/P EST MOD 30 MIN: CPT | Performed by: INTERNAL MEDICINE

## 2022-10-12 ASSESSMENT — ENCOUNTER SYMPTOMS: SHORTNESS OF BREATH: 0

## 2022-10-12 ASSESSMENT — PATIENT HEALTH QUESTIONNAIRE - PHQ9
SUM OF ALL RESPONSES TO PHQ QUESTIONS 1-9: 0
SUM OF ALL RESPONSES TO PHQ QUESTIONS 1-9: 0
2. FEELING DOWN, DEPRESSED OR HOPELESS: 0
SUM OF ALL RESPONSES TO PHQ QUESTIONS 1-9: 0
SUM OF ALL RESPONSES TO PHQ9 QUESTIONS 1 & 2: 0
SUM OF ALL RESPONSES TO PHQ QUESTIONS 1-9: 0
1. LITTLE INTEREST OR PLEASURE IN DOING THINGS: 0

## 2022-10-12 NOTE — PROGRESS NOTES
HPI: Bharati Wood (: 1940)    Feels like her heart is racing and irregular at times  Happened before coming into office today  Does not feel lightheaded or weak when it occurs and no CP    Has hx of SVT and Aortic regurg noted on Echo in past    On anticoag due to DVT    Problem List:  Patient Active Problem List   Diagnosis    Osteopenia    Glaucoma    History of left breast cancer    History of right breast cancer    Abnormal EKG    Chronic deep vein thrombosis (DVT) of distal vein of both lower extremities ([address]    Essential hypertension    Other primary thrombophilia ([address]    Malignant neoplasm of overlapping sites of left female breast, unspecified estrogen receptor status ([address]    History of supraventricular tachycardia       History:  Past Medical History:   Diagnosis Date    Arthritis     Cancer ([address]     breast cancer x 2    Diverticulosis     DVT of lower extremity (deep venous thrombosis) ([address]     left post op 1996-long term tx with Eliquis    Essential hypertension 3/18/2019    Glaucoma 2018    Revision eye care    Hearing loss in left ear     History of right breast cancer     History of supraventricular tachycardia 2018    s/p Ablation    Hx of bone density study 2020    osteopenia    Hx of breast cancer 2016    left    Hx of complete eye exam 2019    due every 3mos- next appt May 7- Dr. Jackie Pete    Hx of echocardiogram 2103    mild MVP,mild LAE, EF of 58%    Osteopenia 09/10/2018    by Bone Density       Allergies: Allergies   Allergen Reactions    Cephalexin Rash       Current Medications:  Current Outpatient Medications   Medication Sig Dispense Refill    ELIQUIS 2.5 MG TABS tablet Take 1 tablet by mouth twice daily 180 tablet 2    alendronate (FOSAMAX) 70 MG tablet Take 1 tablet by mouth once a week 12 tablet 0    anastrozole (ARIMIDEX) 1 MG tablet TAKE 1 TABLET BY MOUTH ONCE DAILY      atenolol (TENORMIN) 25 MG tablet Take 1 tablet daily.        No current facility-administered medications for this visit. Review of Systems:  Review of Systems   Constitutional:  Negative for unexpected weight change. Respiratory:  Negative for shortness of breath. Cardiovascular:  Positive for palpitations. Negative for chest pain. All other systems reviewed and are negative. Vitals:  /70   Pulse 89   Ht 5' 4\" (1.626 m)   Wt 161 lb (73 kg)   SpO2 98%   BMI 27.64 kg/m²     Physical Exam:  Physical Exam  Vitals reviewed. Constitutional:       Appearance: Normal appearance. HENT:      Head: Normocephalic and atraumatic. Cardiovascular:      Rate and Rhythm: Normal rate. Rhythm irregular. Heart sounds: Normal heart sounds. Pulmonary:      Effort: Pulmonary effort is normal.      Breath sounds: Normal breath sounds. Musculoskeletal:         General: Normal range of motion. Cervical back: Normal range of motion and neck supple. Skin:     General: Skin is warm and dry. Neurological:      General: No focal deficit present. Mental Status: She is alert and oriented to person, place, and time. Psychiatric:         Mood and Affect: Mood normal.         Behavior: Behavior normal.         Thought Content: Thought content normal.         Judgment: Judgment normal.        Assessment/Plan:   Tex Don was seen today for follow-up.     Diagnoses and all orders for this visit:    Chronic deep vein thrombosis (DVT) of distal vein of both lower extremities (HCC)  On Eliquis  Malignant neoplasm of overlapping sites of left female breast, unspecified estrogen receptor status ([facility]  Sees Oncology  Essential hypertension  -     EKG 12 Lead; Future  BP has been controlled on atenolol  Osteopenia, unspecified location  On Fosamax  History of supraventricular tachycardia    Intermittent palpitations  -     EKG 12 Lead; Future    Sinus rhythm with rate of 85 on EKG  Need Cardiology eval- may need Holter or event monitor    Current medications are therapeutic at this time; continue as prescribed.         Eva Ruvalcaba MD

## 2022-10-31 ENCOUNTER — OFFICE VISIT (OUTPATIENT)
Dept: CARDIOLOGY CLINIC | Age: 82
End: 2022-10-31
Payer: MEDICARE

## 2022-10-31 VITALS
HEART RATE: 72 BPM | BODY MASS INDEX: 28.13 KG/M2 | HEIGHT: 64 IN | DIASTOLIC BLOOD PRESSURE: 70 MMHG | SYSTOLIC BLOOD PRESSURE: 138 MMHG | WEIGHT: 164.8 LBS

## 2022-10-31 DIAGNOSIS — I10 ESSENTIAL HYPERTENSION: ICD-10-CM

## 2022-10-31 DIAGNOSIS — R00.0 TACHYCARDIA: ICD-10-CM

## 2022-10-31 DIAGNOSIS — I34.1 MITRAL VALVE PROLAPSE: Primary | ICD-10-CM

## 2022-10-31 PROCEDURE — 1090F PRES/ABSN URINE INCON ASSESS: CPT | Performed by: INTERNAL MEDICINE

## 2022-10-31 PROCEDURE — 1036F TOBACCO NON-USER: CPT | Performed by: INTERNAL MEDICINE

## 2022-10-31 PROCEDURE — G8484 FLU IMMUNIZE NO ADMIN: HCPCS | Performed by: INTERNAL MEDICINE

## 2022-10-31 PROCEDURE — 3074F SYST BP LT 130 MM HG: CPT | Performed by: INTERNAL MEDICINE

## 2022-10-31 PROCEDURE — G8417 CALC BMI ABV UP PARAM F/U: HCPCS | Performed by: INTERNAL MEDICINE

## 2022-10-31 PROCEDURE — G8400 PT W/DXA NO RESULTS DOC: HCPCS | Performed by: INTERNAL MEDICINE

## 2022-10-31 PROCEDURE — 3078F DIAST BP <80 MM HG: CPT | Performed by: INTERNAL MEDICINE

## 2022-10-31 PROCEDURE — 99214 OFFICE O/P EST MOD 30 MIN: CPT | Performed by: INTERNAL MEDICINE

## 2022-10-31 PROCEDURE — 1123F ACP DISCUSS/DSCN MKR DOCD: CPT | Performed by: INTERNAL MEDICINE

## 2022-10-31 PROCEDURE — G8428 CUR MEDS NOT DOCUMENT: HCPCS | Performed by: INTERNAL MEDICINE

## 2022-10-31 ASSESSMENT — ENCOUNTER SYMPTOMS
EYE PAIN: 0
APHONIA: 0
ABDOMINAL PAIN: 0
NAIL CHANGES: 0
STRIDOR: 0
COUGH: 0

## 2022-10-31 NOTE — PROGRESS NOTES
[address], [address] [employer] [facility], [address] [place] [address]  PHONE: [phone]    SUBJECTIVE:   Jose Chaidez is a 81 y.o. female 1940      Chief Complaint   Patient presents with    Hypertension        Hypertension   . History of present illness: 81 y.o. female The patient presented for follow up 10/31/22. History of SVT status post SVT ablation 2018 additional history of provoked DVT on chronic anticoagulation therapy. Patient was seen previously for episodes of palpitations and a wearable device was discussed. Since that time worsening symptoms of palpitations history of MVP     Cardiac History:  2018 ablation of supraventricular tachycardia  2018 echocardiogram normal left ventricular systolic function mitral valve prolapse mild MR      Assessment:  Tachycardia  Holter planned  Hypertension  Key CAD CHF Meds            ELIQUIS 2.5 MG TABS tablet (Taking)    Sig: Take 1 tablet by mouth twice daily    atenolol (TENORMIN) 25 MG tablet (Taking)    Class: Historical Med          History of DVT  On AC long term   History of mitral valve prolapse    Past Medical History, Past Surgical History, Family history, Social History, and Medications were all reviewed with the patient today and updated as necessary.        Allergies   Allergen Reactions    Cephalexin Rash     Past Medical History:   Diagnosis Date    Arthritis     Cancer ([address]     breast cancer x 2    Diverticulosis     DVT of lower extremity (deep venous thrombosis) ([address]     left post op 1996-long term tx with Eliquis    Essential hypertension 3/18/2019    Glaucoma 2018    Revision eye care    Hearing loss in left ear     History of right breast cancer     History of supraventricular tachycardia 2018    s/p Ablation    Hx of bone density study 09/25/2020    osteopenia    Hx of breast cancer 2016    left    Hx of complete eye exam 2019    due every 3mos- next appt May 7- Dr. Megan Ashford    Hx of echocardiogram 06/28/2103    mild MVP,mild LAE, EF of 58%    Osteopenia 09/10/2018    by Bone Density     Past Surgical History:   Procedure Laterality Date    BREAST BIOPSY Left 08/02/2016    left breast cancer-grade 1 invasive ductal cancer    CARDIAC ELECTROPHYSIOLOGY MAPPING AND ABLATION  08/2018    Dr. Luma Rod; SVT    COLONOSCOPY  05/07/2009    Dr. Yady Tello- Diverticulosis and rectal polyp    HERNIA REPAIR Right     inguinal    MASTECTOMY Right 09/2016     Family History   Problem Relation Age of Onset    Breast Cancer Mother 28    Cancer Father 76    Heart Disease Paternal Grandmother     Diabetes Maternal Grandmother     Prostate Cancer Brother     Cancer Sister         thyroid    Breast Cancer Paternal Aunt     Cancer Paternal Aunt         stomach     Social History     Tobacco Use    Smoking status: Never     Passive exposure: Yes    Smokeless tobacco: Never   Substance Use Topics    Alcohol use: Yes     Alcohol/week: 1.0 standard drink       ROS:    Review of Systems   Constitutional: Negative for fever. HENT:  Negative for stridor. Eyes:  Negative for pain. Cardiovascular:  Negative for chest pain. Respiratory:  Negative for cough. Endocrine: Negative for cold intolerance. Skin:  Negative for nail changes. Musculoskeletal:  Negative for arthritis. Gastrointestinal:  Negative for abdominal pain. Genitourinary:  Negative for dysuria. Neurological:  Negative for aphonia. Psychiatric/Behavioral:  Negative for altered mental status. Allergic/Immunologic: Negative for hives. PHYSICAL EXAM:   /70   Pulse 72   Ht 5' 4\" (1.626 m)   Wt 164 lb 12.8 oz (74.8 kg)   BMI 28.29 kg/m²      Physical Exam  Vitals reviewed. HENT:      Head: Normocephalic. Right Ear: External ear normal.      Left Ear: External ear normal.      Nose: Nose normal.   Eyes:      General: No scleral icterus. Pulmonary:      Effort: Pulmonary effort is normal.   Abdominal:      General: There is no distension.    Musculoskeletal:      Cervical back: Neck supple. Skin:     General: Skin is warm. Neurological:      Mental Status: She is alert. Mental status is at baseline. Medical problems and test results were reviewed with the patient today. No results found for any visits on 10/31/22. No results found for this or any previous visit (from the past 672 hour(s)). No results found for: CHOL, CHOLPOCT, CHOLX, CHLST, CHOLV, HDL, HDLPOC, HDLC, LDL, LDLC, VLDLC, VLDL, TGLX, TRIGL    No results found for any visits on 10/31/22. Zina Bates was seen today for hypertension. Diagnoses and all orders for this visit:    Mitral valve prolapse  -     Transthoracic echocardiogram (TTE) complete with contrast, bubble, strain, and 3D PRN; Future    Tachycardia  -     Transthoracic echocardiogram (TTE) complete with contrast, bubble, strain, and 3D PRN; Future  -     Extended cardiac holter monitor (48 hrs - 15 days); Future    Essential hypertension    Return in about 1 month (around 11/30/2022). Thank you for allowing me to participate in this patient's care. Please call or contact me if there are any questions or concerns regarding the above.       Kristina Harding MD  10/31/22  9:28 AM      Proofread, but unrecognized errors may exist.

## 2022-11-29 RX ORDER — ATENOLOL 25 MG/1
TABLET ORAL
Qty: 90 TABLET | Refills: 2 | Status: SHIPPED | OUTPATIENT
Start: 2022-11-29

## 2022-12-06 ENCOUNTER — OFFICE VISIT (OUTPATIENT)
Dept: CARDIOLOGY CLINIC | Age: 82
End: 2022-12-06
Payer: MEDICARE

## 2022-12-06 VITALS
WEIGHT: 165.6 LBS | HEIGHT: 64 IN | DIASTOLIC BLOOD PRESSURE: 88 MMHG | SYSTOLIC BLOOD PRESSURE: 138 MMHG | HEART RATE: 84 BPM | BODY MASS INDEX: 28.27 KG/M2

## 2022-12-06 DIAGNOSIS — R00.0 TACHYCARDIA: ICD-10-CM

## 2022-12-06 DIAGNOSIS — I10 ESSENTIAL HYPERTENSION: Primary | ICD-10-CM

## 2022-12-06 DIAGNOSIS — I34.1 MITRAL VALVE PROLAPSE: ICD-10-CM

## 2022-12-06 PROCEDURE — 1123F ACP DISCUSS/DSCN MKR DOCD: CPT | Performed by: INTERNAL MEDICINE

## 2022-12-06 PROCEDURE — G8400 PT W/DXA NO RESULTS DOC: HCPCS | Performed by: INTERNAL MEDICINE

## 2022-12-06 PROCEDURE — 3078F DIAST BP <80 MM HG: CPT | Performed by: INTERNAL MEDICINE

## 2022-12-06 PROCEDURE — G8417 CALC BMI ABV UP PARAM F/U: HCPCS | Performed by: INTERNAL MEDICINE

## 2022-12-06 PROCEDURE — 1036F TOBACCO NON-USER: CPT | Performed by: INTERNAL MEDICINE

## 2022-12-06 PROCEDURE — 99213 OFFICE O/P EST LOW 20 MIN: CPT | Performed by: INTERNAL MEDICINE

## 2022-12-06 PROCEDURE — 1090F PRES/ABSN URINE INCON ASSESS: CPT | Performed by: INTERNAL MEDICINE

## 2022-12-06 PROCEDURE — G8484 FLU IMMUNIZE NO ADMIN: HCPCS | Performed by: INTERNAL MEDICINE

## 2022-12-06 PROCEDURE — 3074F SYST BP LT 130 MM HG: CPT | Performed by: INTERNAL MEDICINE

## 2022-12-06 PROCEDURE — G8428 CUR MEDS NOT DOCUMENT: HCPCS | Performed by: INTERNAL MEDICINE

## 2022-12-06 ASSESSMENT — ENCOUNTER SYMPTOMS
COUGH: 0
APHONIA: 0
ABDOMINAL PAIN: 0
EYE PAIN: 0
STRIDOR: 0
NAIL CHANGES: 0

## 2022-12-06 NOTE — PROGRESS NOTES
[address], [address] [employer] [facility], [address] [place] [address]  PHONE: [phone]    SUBJECTIVE:   Jermaine Carroll is a 81 y.o. female 1940      Chief Complaint   Patient presents with    Tachycardia    Results        Tachycardia and Results   . History of present illness: 81 y.o. female The patient presented for follow up 12/06/22. Here for echocardiogram and cardiac Holter monitoring results no atrial fibrillation was detected. Patient did have a short episode of wide-complex rhythm lasting 6 beats otherwise ventricular ectopic beats noted      Interval history:  History of SVT status post SVT ablation 2018 additional history of provoked DVT on chronic anticoagulation therapy. Patient was seen previously for episodes of palpitations and a wearable device was discussed. Since that time worsening symptoms of palpitations history of MVP     Cardiac History:  2018 ablation of supraventricular tachycardia  2018 echocardiogram normal left ventricular systolic function mitral valve prolapse mild MR  10/31/2022 echocardiogram ejection fraction 55 to 60% mildly increased wall thickness left atrium dilated diastolic dysfunction  10/04/8567 cardiac monitor min 54 Max 200 average 85 bpm 1 run of wide-complex tachycardia lasting 6 beats atrial and ventricular ectopic beats noted      Assessment:  Tachycardia  Holter with no arrhythmia noted  Hypertension  Key CAD CHF Meds            atenolol (TENORMIN) 25 MG tablet (Taking)    Sig: Take 1 tablet by mouth once daily    ELIQUIS 2.5 MG TABS tablet (Taking)    Sig: Take 1 tablet by mouth twice daily          History of DVT  On AC long term   History of mitral valve prolapse  No significant mitral regurgitation noted on last echocardiogram    Past Medical History, Past Surgical History, Family history, Social History, and Medications were all reviewed with the patient today and updated as necessary.        Allergies   Allergen Reactions    Cephalexin Rash     Past Medical History:   Diagnosis Date    Arthritis     Cancer ([facility]     breast cancer x 2    Diverticulosis     DVT of lower extremity (deep venous thrombosis) ([facility]     left post op 1996-long term tx with Eliquis    Essential hypertension 3/18/2019    Glaucoma 2018    Revision eye care    Hearing loss in left ear     History of right breast cancer     History of supraventricular tachycardia 2018    s/p Ablation    Hx of bone density study 09/25/2020    osteopenia    Hx of breast cancer 2016    left    Hx of complete eye exam 2019    due every 3mos- next appt May 7- Dr. Pascual Brady    Hx of echocardiogram 06/28/2103    mild MVP,mild LAE, EF of 58%    Osteopenia 09/10/2018    by Bone Density     Past Surgical History:   Procedure Laterality Date    BREAST BIOPSY Left 08/02/2016    left breast cancer-grade 1 invasive ductal cancer    CARDIAC ELECTROPHYSIOLOGY MAPPING AND ABLATION  08/2018    Dr. Brigitte Geronimo; SVT    COLONOSCOPY  05/07/2009    Dr. Linda Grady- Diverticulosis and rectal polyp    HERNIA REPAIR Right     inguinal    MASTECTOMY Right 09/2016     Family History   Problem Relation Age of Onset    Breast Cancer Mother 28    Cancer Father 76    Heart Disease Paternal Grandmother     Diabetes Maternal Grandmother     Prostate Cancer Brother     Cancer Sister         thyroid    Breast Cancer Paternal Aunt     Cancer Paternal Aunt         stomach     Social History     Tobacco Use    Smoking status: Never     Passive exposure: Yes    Smokeless tobacco: Never   Substance Use Topics    Alcohol use: Yes     Alcohol/week: 1.0 standard drink       ROS:    Review of Systems   Constitutional: Negative for fever. HENT:  Negative for stridor. Eyes:  Negative for pain. Cardiovascular:  Negative for chest pain. Respiratory:  Negative for cough. Endocrine: Negative for cold intolerance. Skin:  Negative for nail changes. Musculoskeletal:  Negative for arthritis. Gastrointestinal:  Negative for abdominal pain.    Genitourinary: Negative for dysuria. Neurological:  Negative for aphonia. Psychiatric/Behavioral:  Negative for altered mental status. Allergic/Immunologic: Negative for hives. PHYSICAL EXAM:   /88   Pulse 84   Ht 5' 4\" (1.626 m)   Wt 165 lb 9.6 oz (75.1 kg)   BMI 28.43 kg/m²      Physical Exam  Vitals reviewed. HENT:      Head: Normocephalic. Right Ear: External ear normal.      Left Ear: External ear normal.      Nose: Nose normal.   Eyes:      General: No scleral icterus. Pulmonary:      Effort: Pulmonary effort is normal.   Abdominal:      General: There is no distension. Musculoskeletal:      Cervical back: Neck supple. Skin:     General: Skin is warm. Neurological:      Mental Status: She is alert. Mental status is at baseline. Medical problems and test results were reviewed with the patient today. No results found for any visits on 12/06/22.       Recent Results (from the past 672 hour(s))   Transthoracic echocardiogram (TTE) complete with contrast, bubble, strain, and 3D PRN    Collection Time: 11/15/22  2:46 PM   Result Value Ref Range    LV EDV A2C 77 mL    LV EDV A4C 59 mL    LV ESV A2C 34 mL    LV ESV A4C 25 mL    IVSd 1.2 (A) 0.6 - 0.9 cm    LVIDd 4.7 3.9 - 5.3 cm    LVIDs 3.3 cm    LVOT Diameter 2.0 cm    LVOT Mean Gradient 2 mmHg    LVOT VTI 18.5 cm    LVPWd 1.2 (A) 0.6 - 0.9 cm    LV E' Lateral Velocity 7 cm/s    LV E' Septal Velocity 5 cm/s    LV Ejection Fraction A2C 56 %    LV Ejection Fraction A4C 55 %    EF BP 55 55 - 100 %    LVOT Area 3.1 cm2    LVOT SV 58.1 ml    LA Minor Axis 4.3 cm    LA Major [place] 5.1 cm    LA Area 2C 19.4 cm2    LA Area 4C 18.2 cm2    LA Volume 2C 63 (A) 22 - 52 mL    LA Volume 4C 51 22 - 52 mL    LA Volume BP 62 (A) 22 - 52 mL    LA Diameter 3.7 cm    AV Mean Gradient 4 mmHg    AV VTI 27.0 cm    AV Mean Velocity 1.0 m/s    AV Area by VTI 2.2 cm2    Aortic Root 2.8 cm    MV E Wave Deceleration Time 173.0 ms    MV A Velocity 0.97 m/s    MV E Velocity 0.88 m/s    Est. RA Pressure 3 mmHg    RVIDd 2.9 cm    TR Max Velocity 2.36 m/s    TR Peak Gradient 22 mmHg    Body Surface Area 1.83 m2    Fractional Shortening 2D 30 28 - 44 %    LV ESV Index A4C 14 mL/m2    LV EDV Index A4C 33 mL/m2    LV ESV Index A2C 19 mL/m2    LV EDV Index A2C 43 mL/m2    LVIDd Index 2.61 cm/m2    LVIDs Index 1.83 cm/m2    LV RWT Ratio 0.51     LV Mass 2D 212.0 (A) 67 - 162 g    LV Mass 2D Index 117.8 (A) 43 - 95 g/m2    MV E/A 0.91     E/E' Ratio (Averaged) 15.09     E/E' Lateral 12.57     E/E' Septal 17.60     LA Volume Index BP 34 16 - 34 ml/m2    LVOT Stroke Volume Index 32.3 mL/m2    LA Volume Index 2C 35 (A) 16 - 34 mL/m2    LA Volume Index 4C 28 16 - 34 mL/m2    LA Size Index 2.06 cm/m2    LA/AO Root Ratio 1.32     Ao Root Index 1.56 cm/m2    LVOT:AV VTI Index 0.69     ROB/BSA VTI 1.2 cm2/m2    RVSP 25 mmHg    RV Basal Dimension 2.9 cm    RV Mid Dimension 2.6 cm    TAPSE 2.2 1.7 cm    RA Major Axis 3.5 cm    RA Major Axis Index 1.94 cm/m2    Aortic Sinus Valsalva 3.5 cm    Aortic Sinus Valsalva Index 1.94 cm/m2    Ascending Aorta 2.8 cm    Ascending Aorta Index 1.56 cm/m2    Sinotubular Junction 2.5 cm    Left Ventricular Ejection Fraction 58     LVEF MODALITY ECHO      No results found for: CHOL, CHOLPOCT, CHOLX, CHLST, CHOLV, HDL, HDLPOC, HDLC, LDL, LDLC, VLDLC, VLDL, TGLX, TRIGL    No results found for any visits on 12/06/22. Anastacia Carrasquillo was seen today for tachycardia and results. Diagnoses and all orders for this visit:    Essential hypertension    Tachycardia    Mitral valve prolapse    Return in about 1 year (around 12/6/2023). Thank you for allowing me to participate in this patient's care. Please call or contact me if there are any questions or concerns regarding the above.       Kristin Parker MD  12/06/22  1:43 PM      Proofread, but unrecognized errors may exist.

## 2023-01-15 RX ORDER — ALENDRONATE SODIUM 70 MG/1
TABLET ORAL
Qty: 12 TABLET | Refills: 0 | Status: SHIPPED | OUTPATIENT
Start: 2023-01-15

## 2023-04-18 ENCOUNTER — OFFICE VISIT (OUTPATIENT)
Dept: INTERNAL MEDICINE CLINIC | Facility: CLINIC | Age: 83
End: 2023-04-18
Payer: MEDICARE

## 2023-04-18 VITALS
TEMPERATURE: 98.1 F | HEART RATE: 75 BPM | DIASTOLIC BLOOD PRESSURE: 62 MMHG | BODY MASS INDEX: 28.24 KG/M2 | WEIGHT: 165.4 LBS | HEIGHT: 64 IN | OXYGEN SATURATION: 96 % | SYSTOLIC BLOOD PRESSURE: 118 MMHG

## 2023-04-18 DIAGNOSIS — I10 ESSENTIAL HYPERTENSION: Primary | ICD-10-CM

## 2023-04-18 DIAGNOSIS — D68.59 OTHER PRIMARY THROMBOPHILIA (HCC): ICD-10-CM

## 2023-04-18 DIAGNOSIS — I82.5Z3 CHRONIC DEEP VEIN THROMBOSIS (DVT) OF DISTAL VEIN OF BOTH LOWER EXTREMITIES (HCC): ICD-10-CM

## 2023-04-18 DIAGNOSIS — C50.812 MALIGNANT NEOPLASM OF OVERLAPPING SITES OF LEFT FEMALE BREAST, UNSPECIFIED ESTROGEN RECEPTOR STATUS (HCC): ICD-10-CM

## 2023-04-18 PROCEDURE — 1090F PRES/ABSN URINE INCON ASSESS: CPT | Performed by: INTERNAL MEDICINE

## 2023-04-18 PROCEDURE — 3078F DIAST BP <80 MM HG: CPT | Performed by: INTERNAL MEDICINE

## 2023-04-18 PROCEDURE — G8417 CALC BMI ABV UP PARAM F/U: HCPCS | Performed by: INTERNAL MEDICINE

## 2023-04-18 PROCEDURE — 99214 OFFICE O/P EST MOD 30 MIN: CPT | Performed by: INTERNAL MEDICINE

## 2023-04-18 PROCEDURE — G8427 DOCREV CUR MEDS BY ELIG CLIN: HCPCS | Performed by: INTERNAL MEDICINE

## 2023-04-18 PROCEDURE — G8400 PT W/DXA NO RESULTS DOC: HCPCS | Performed by: INTERNAL MEDICINE

## 2023-04-18 PROCEDURE — 1123F ACP DISCUSS/DSCN MKR DOCD: CPT | Performed by: INTERNAL MEDICINE

## 2023-04-18 PROCEDURE — 3074F SYST BP LT 130 MM HG: CPT | Performed by: INTERNAL MEDICINE

## 2023-04-18 PROCEDURE — 1036F TOBACCO NON-USER: CPT | Performed by: INTERNAL MEDICINE

## 2023-04-18 RX ORDER — PALBOCICLIB 125 MG/1
125 TABLET, FILM COATED ORAL DAILY
COMMUNITY
Start: 2023-04-07

## 2023-04-18 SDOH — ECONOMIC STABILITY: FOOD INSECURITY: WITHIN THE PAST 12 MONTHS, THE FOOD YOU BOUGHT JUST DIDN'T LAST AND YOU DIDN'T HAVE MONEY TO GET MORE.: NEVER TRUE

## 2023-04-18 SDOH — ECONOMIC STABILITY: HOUSING INSECURITY
IN THE LAST 12 MONTHS, WAS THERE A TIME WHEN YOU DID NOT HAVE A STEADY PLACE TO SLEEP OR SLEPT IN A SHELTER (INCLUDING NOW)?: NO

## 2023-04-18 SDOH — ECONOMIC STABILITY: INCOME INSECURITY: HOW HARD IS IT FOR YOU TO PAY FOR THE VERY BASICS LIKE FOOD, HOUSING, MEDICAL CARE, AND HEATING?: NOT HARD AT ALL

## 2023-04-18 SDOH — ECONOMIC STABILITY: FOOD INSECURITY: WITHIN THE PAST 12 MONTHS, YOU WORRIED THAT YOUR FOOD WOULD RUN OUT BEFORE YOU GOT MONEY TO BUY MORE.: NEVER TRUE

## 2023-04-18 ASSESSMENT — PATIENT HEALTH QUESTIONNAIRE - PHQ9
SUM OF ALL RESPONSES TO PHQ QUESTIONS 1-9: 0
SUM OF ALL RESPONSES TO PHQ QUESTIONS 1-9: 0
SUM OF ALL RESPONSES TO PHQ9 QUESTIONS 1 & 2: 0
1. LITTLE INTEREST OR PLEASURE IN DOING THINGS: 0
SUM OF ALL RESPONSES TO PHQ QUESTIONS 1-9: 0
2. FEELING DOWN, DEPRESSED OR HOPELESS: 0
SUM OF ALL RESPONSES TO PHQ QUESTIONS 1-9: 0

## 2023-04-18 NOTE — PROGRESS NOTES
follow with Oncology  Continue Eliquis   RF on Fosamax - BD done 9/22 and has osteopenia    Current medications are therapeutic at this time; continue as prescribed.         Wolf Jimenez MD

## 2023-04-19 RX ORDER — ALENDRONATE SODIUM 70 MG/1
TABLET ORAL
Qty: 12 TABLET | Refills: 0 | Status: SHIPPED | OUTPATIENT
Start: 2023-04-19

## 2023-04-19 ASSESSMENT — ENCOUNTER SYMPTOMS: SHORTNESS OF BREATH: 0

## 2023-07-17 RX ORDER — ALENDRONATE SODIUM 70 MG/1
TABLET ORAL
Qty: 12 TABLET | Refills: 2 | Status: SHIPPED | OUTPATIENT
Start: 2023-07-17

## 2023-07-26 RX ORDER — APIXABAN 2.5 MG/1
TABLET, FILM COATED ORAL
Qty: 180 TABLET | Refills: 1 | Status: SHIPPED | OUTPATIENT
Start: 2023-07-26

## 2023-07-26 NOTE — TELEPHONE ENCOUNTER
Pt called to request Eliquis RF - this is pended to Dr. Asad Das. Informed pt to check with pharmacy later today.     Keisha Calixto, APRN - CNP

## 2023-08-28 RX ORDER — ATENOLOL 25 MG/1
TABLET ORAL
Qty: 90 TABLET | Refills: 1 | Status: SHIPPED | OUTPATIENT
Start: 2023-08-28

## 2023-09-05 ENCOUNTER — TELEPHONE (OUTPATIENT)
Age: 83
End: 2023-09-05

## 2023-09-05 NOTE — TELEPHONE ENCOUNTER
Patient is having rapid heart beat this afternoon since about 10 after one and it is still going she said about twice as fast and also had this happen about 8 days ago.

## 2023-09-05 NOTE — TELEPHONE ENCOUNTER
Pt c/o HR beating faster than usual. C/o fatigue. Worse going up and down stairs, walking around. Denies sob,  palpitations. Light headedness. Cannot get BP monitor to work. Is taking Atenolol, Eliquis as ordered. Hydrating well, most days. 1/2 caf coffee x1 c daily  PCP appt tomorrow. Advise take BP monitor to appt for assistance. If gets monitor working, keep VS log once daily. Bring to [address] [facility] f/u 9/18/23 2p MA. Pt voiced understanding and agreement with POC.   cgh normal appearance , without tenderness upon palpation , no deformities , trachea midline , normal appearance , without tenderness upon palpation , no deformities , trachea midline

## 2023-09-06 ENCOUNTER — OFFICE VISIT (OUTPATIENT)
Dept: INTERNAL MEDICINE CLINIC | Facility: CLINIC | Age: 83
End: 2023-09-06
Payer: MEDICARE

## 2023-09-06 VITALS
BODY MASS INDEX: 27.31 KG/M2 | WEIGHT: 160 LBS | HEART RATE: 77 BPM | SYSTOLIC BLOOD PRESSURE: 130 MMHG | DIASTOLIC BLOOD PRESSURE: 80 MMHG | HEIGHT: 64 IN

## 2023-09-06 DIAGNOSIS — Z23 NEED FOR INFLUENZA VACCINATION: Primary | ICD-10-CM

## 2023-09-06 DIAGNOSIS — Z86.79 HX OF SUPRAVENTRICULAR TACHYCARDIA: ICD-10-CM

## 2023-09-06 DIAGNOSIS — I10 ESSENTIAL HYPERTENSION: ICD-10-CM

## 2023-09-06 DIAGNOSIS — R00.0 TACHYCARDIA: Primary | ICD-10-CM

## 2023-09-06 PROCEDURE — 93000 ELECTROCARDIOGRAM COMPLETE: CPT | Performed by: INTERNAL MEDICINE

## 2023-09-06 PROCEDURE — G8400 PT W/DXA NO RESULTS DOC: HCPCS | Performed by: INTERNAL MEDICINE

## 2023-09-06 PROCEDURE — 90694 VACC AIIV4 NO PRSRV 0.5ML IM: CPT | Performed by: INTERNAL MEDICINE

## 2023-09-06 PROCEDURE — 3079F DIAST BP 80-89 MM HG: CPT | Performed by: INTERNAL MEDICINE

## 2023-09-06 PROCEDURE — 1036F TOBACCO NON-USER: CPT | Performed by: INTERNAL MEDICINE

## 2023-09-06 PROCEDURE — 99214 OFFICE O/P EST MOD 30 MIN: CPT | Performed by: INTERNAL MEDICINE

## 2023-09-06 PROCEDURE — G8417 CALC BMI ABV UP PARAM F/U: HCPCS | Performed by: INTERNAL MEDICINE

## 2023-09-06 PROCEDURE — G0008 ADMIN INFLUENZA VIRUS VAC: HCPCS | Performed by: INTERNAL MEDICINE

## 2023-09-06 PROCEDURE — 3075F SYST BP GE 130 - 139MM HG: CPT | Performed by: INTERNAL MEDICINE

## 2023-09-06 PROCEDURE — 1090F PRES/ABSN URINE INCON ASSESS: CPT | Performed by: INTERNAL MEDICINE

## 2023-09-06 PROCEDURE — G8427 DOCREV CUR MEDS BY ELIG CLIN: HCPCS | Performed by: INTERNAL MEDICINE

## 2023-09-06 PROCEDURE — 1123F ACP DISCUSS/DSCN MKR DOCD: CPT | Performed by: INTERNAL MEDICINE

## 2023-09-06 RX ORDER — AMOXICILLIN 500 MG/1
CAPSULE ORAL
COMMUNITY
Start: 2023-05-31

## 2023-09-06 NOTE — PROGRESS NOTES
HPI: Delia Caputo (: 1940)    States she has been feeling her heart racing at times  Checked her BP and it was in range but her HR was in the 140-150 range  Did not feel like she was going to pass out and did not have any CP    She has been taking her Atenolol and does have hx of SVT    Due to see Dr Abelino Garcia later this month    Has had an Echo     Problem List:  Patient Active Problem List   Diagnosis    Osteopenia    Glaucoma    History of left breast cancer    History of right breast cancer    Abnormal EKG    Chronic deep vein thrombosis (DVT) of distal vein of both lower extremities ([address])    Essential hypertension    Other primary thrombophilia ([address])    Malignant neoplasm of overlapping sites of left female breast, unspecified estrogen receptor status ([address])    Hx of supraventricular tachycardia       History:  Past Medical History:   Diagnosis Date    Arthritis     Cancer ([address])     breast cancer x 2    Diverticulosis     DVT of lower extremity (deep venous thrombosis) ([address])     left post op -long term tx with Eliquis    Essential hypertension 3/18/2019    Glaucoma 2018    Revision eye care    Hearing loss in left ear     History of right breast cancer     History of supraventricular tachycardia 2018    s/p Ablation    Hx of bone density study 2020    osteopenia    Hx of breast cancer 2016    left    Hx of complete eye exam 2019    due every 3mos- next appt May 7- Dr. Traci Orlando    Hx of echocardiogram 2103    mild MVP,mild LAE, EF of 58%    Osteopenia 09/10/2018    by Bone Density       Allergies:   Allergies   Allergen Reactions    Cephalexin Rash       Current Medications:  Current Outpatient Medications   Medication Sig Dispense Refill    amoxicillin (AMOXIL) 500 MG capsule TAKE FOUR CAPSULES BY MOUTH ONE HOUR BEFORE APPOINTMENT      atenolol (TENORMIN) 25 MG tablet Take 1 tablet by mouth once daily 90 tablet 1    ELIQUIS 2.5 MG TABS tablet Take 1 tablet by mouth twice daily 180

## 2023-09-11 ENCOUNTER — NURSE ONLY (OUTPATIENT)
Dept: INTERNAL MEDICINE CLINIC | Facility: CLINIC | Age: 83
End: 2023-09-11
Payer: MEDICARE

## 2023-09-11 VITALS
DIASTOLIC BLOOD PRESSURE: 60 MMHG | HEIGHT: 64 IN | BODY MASS INDEX: 27.31 KG/M2 | WEIGHT: 160 LBS | SYSTOLIC BLOOD PRESSURE: 104 MMHG | OXYGEN SATURATION: 99 % | HEART RATE: 155 BPM

## 2023-09-11 DIAGNOSIS — R00.0 TACHYCARDIA: Primary | ICD-10-CM

## 2023-09-11 PROCEDURE — 3074F SYST BP LT 130 MM HG: CPT | Performed by: INTERNAL MEDICINE

## 2023-09-11 PROCEDURE — 99211 OFF/OP EST MAY X REQ PHY/QHP: CPT | Performed by: INTERNAL MEDICINE

## 2023-09-11 PROCEDURE — 3078F DIAST BP <80 MM HG: CPT | Performed by: INTERNAL MEDICINE

## 2023-09-11 NOTE — PROGRESS NOTES
Pt came in the office for a nursing visit with c/o elevated heart rate. Pulse 155, 02 sat 99% and BP was 104/60. Per Dr Edwards Roles, \"Pt needs to take an extra Atenolol 25 mg as was discussed at [address] [place] [address] on 09/06/23 and follow up with Dr Clifford Mathew at [address] [place] [address] on 09/18/23. \" Pt notified of instructions at the nursing visit and cardiology will notify pt if they have a cancellation.

## 2023-09-18 ENCOUNTER — OFFICE VISIT (OUTPATIENT)
Age: 83
End: 2023-09-18
Payer: MEDICARE

## 2023-09-18 VITALS
HEIGHT: 64 IN | HEART RATE: 72 BPM | DIASTOLIC BLOOD PRESSURE: 82 MMHG | SYSTOLIC BLOOD PRESSURE: 142 MMHG | BODY MASS INDEX: 27.66 KG/M2 | WEIGHT: 162 LBS

## 2023-09-18 DIAGNOSIS — R00.0 TACHYCARDIA: Primary | ICD-10-CM

## 2023-09-18 DIAGNOSIS — I10 ESSENTIAL HYPERTENSION: ICD-10-CM

## 2023-09-18 PROCEDURE — G8427 DOCREV CUR MEDS BY ELIG CLIN: HCPCS | Performed by: INTERNAL MEDICINE

## 2023-09-18 PROCEDURE — 3078F DIAST BP <80 MM HG: CPT | Performed by: INTERNAL MEDICINE

## 2023-09-18 PROCEDURE — 1036F TOBACCO NON-USER: CPT | Performed by: INTERNAL MEDICINE

## 2023-09-18 PROCEDURE — 1090F PRES/ABSN URINE INCON ASSESS: CPT | Performed by: INTERNAL MEDICINE

## 2023-09-18 PROCEDURE — 1123F ACP DISCUSS/DSCN MKR DOCD: CPT | Performed by: INTERNAL MEDICINE

## 2023-09-18 PROCEDURE — G8400 PT W/DXA NO RESULTS DOC: HCPCS | Performed by: INTERNAL MEDICINE

## 2023-09-18 PROCEDURE — G8417 CALC BMI ABV UP PARAM F/U: HCPCS | Performed by: INTERNAL MEDICINE

## 2023-09-18 PROCEDURE — 3077F SYST BP >= 140 MM HG: CPT | Performed by: INTERNAL MEDICINE

## 2023-09-18 PROCEDURE — 99214 OFFICE O/P EST MOD 30 MIN: CPT | Performed by: INTERNAL MEDICINE

## 2023-09-18 RX ORDER — ABEMACICLIB 150 MG/1
TABLET ORAL
COMMUNITY
Start: 2023-09-12

## 2023-09-18 ASSESSMENT — ENCOUNTER SYMPTOMS
STRIDOR: 0
ABDOMINAL PAIN: 0
EYE PAIN: 0
NAIL CHANGES: 0
COUGH: 0
APHONIA: 0

## 2023-10-27 ENCOUNTER — OFFICE VISIT (OUTPATIENT)
Dept: INTERNAL MEDICINE CLINIC | Facility: CLINIC | Age: 83
End: 2023-10-27
Payer: MEDICARE

## 2023-10-27 VITALS
HEIGHT: 64 IN | SYSTOLIC BLOOD PRESSURE: 110 MMHG | WEIGHT: 151.4 LBS | BODY MASS INDEX: 25.85 KG/M2 | DIASTOLIC BLOOD PRESSURE: 70 MMHG

## 2023-10-27 DIAGNOSIS — M85.80 OSTEOPENIA, UNSPECIFIED LOCATION: ICD-10-CM

## 2023-10-27 DIAGNOSIS — Z85.3 HISTORY OF RIGHT BREAST CANCER: ICD-10-CM

## 2023-10-27 DIAGNOSIS — M54.50 ACUTE BILATERAL LOW BACK PAIN WITHOUT SCIATICA: ICD-10-CM

## 2023-10-27 DIAGNOSIS — R31.0 GROSS HEMATURIA: ICD-10-CM

## 2023-10-27 DIAGNOSIS — I82.5Z3 CHRONIC DEEP VEIN THROMBOSIS (DVT) OF DISTAL VEIN OF BOTH LOWER EXTREMITIES (HCC): ICD-10-CM

## 2023-10-27 DIAGNOSIS — Z85.3 HISTORY OF LEFT BREAST CANCER: Primary | ICD-10-CM

## 2023-10-27 DIAGNOSIS — I10 ESSENTIAL HYPERTENSION: ICD-10-CM

## 2023-10-27 LAB
BILIRUBIN, URINE, POC: ABNORMAL
BLOOD URINE, POC: ABNORMAL
GLUCOSE URINE, POC: NEGATIVE
KETONES, URINE, POC: NEGATIVE
LEUKOCYTE ESTERASE, URINE, POC: ABNORMAL
NITRITE, URINE, POC: NEGATIVE
PH, URINE, POC: 5.5 (ref 4.6–8)
PROTEIN,URINE, POC: 100
SPECIFIC GRAVITY, URINE, POC: 1.03 (ref 1–1.03)
URINALYSIS CLARITY, POC: ABNORMAL
URINALYSIS COLOR, POC: YELLOW
UROBILINOGEN, POC: ABNORMAL

## 2023-10-27 PROCEDURE — 1036F TOBACCO NON-USER: CPT | Performed by: INTERNAL MEDICINE

## 2023-10-27 PROCEDURE — 1123F ACP DISCUSS/DSCN MKR DOCD: CPT | Performed by: INTERNAL MEDICINE

## 2023-10-27 PROCEDURE — 3078F DIAST BP <80 MM HG: CPT | Performed by: INTERNAL MEDICINE

## 2023-10-27 PROCEDURE — 99214 OFFICE O/P EST MOD 30 MIN: CPT | Performed by: INTERNAL MEDICINE

## 2023-10-27 PROCEDURE — G8400 PT W/DXA NO RESULTS DOC: HCPCS | Performed by: INTERNAL MEDICINE

## 2023-10-27 PROCEDURE — 3074F SYST BP LT 130 MM HG: CPT | Performed by: INTERNAL MEDICINE

## 2023-10-27 PROCEDURE — 81003 URINALYSIS AUTO W/O SCOPE: CPT | Performed by: INTERNAL MEDICINE

## 2023-10-27 PROCEDURE — G8427 DOCREV CUR MEDS BY ELIG CLIN: HCPCS | Performed by: INTERNAL MEDICINE

## 2023-10-27 PROCEDURE — G8417 CALC BMI ABV UP PARAM F/U: HCPCS | Performed by: INTERNAL MEDICINE

## 2023-10-27 PROCEDURE — G8484 FLU IMMUNIZE NO ADMIN: HCPCS | Performed by: INTERNAL MEDICINE

## 2023-10-27 PROCEDURE — 1090F PRES/ABSN URINE INCON ASSESS: CPT | Performed by: INTERNAL MEDICINE

## 2023-10-27 RX ORDER — CIPROFLOXACIN 500 MG/1
500 TABLET, FILM COATED ORAL 2 TIMES DAILY
Qty: 6 TABLET | Refills: 0 | Status: SHIPPED | OUTPATIENT
Start: 2023-10-27 | End: 2023-10-30

## 2023-10-27 ASSESSMENT — ENCOUNTER SYMPTOMS
BACK PAIN: 1
SHORTNESS OF BREATH: 0

## 2023-10-30 LAB
BACTERIA SPEC CULT: NORMAL
BACTERIA SPEC CULT: NORMAL
SERVICE CMNT-IMP: NORMAL

## 2023-10-31 DIAGNOSIS — M54.50 ACUTE BILATERAL LOW BACK PAIN WITHOUT SCIATICA: Primary | ICD-10-CM

## 2023-10-31 RX ORDER — PREDNISONE 10 MG/1
TABLET ORAL
Qty: 10 TABLET | Refills: 0 | Status: SHIPPED | OUTPATIENT
Start: 2023-10-31

## 2023-11-01 ENCOUNTER — HOSPITAL ENCOUNTER (OUTPATIENT)
Dept: GENERAL RADIOLOGY | Age: 83
Discharge: HOME | End: 2023-11-03
Payer: MEDICARE

## 2023-11-01 DIAGNOSIS — M54.50 ACUTE BILATERAL LOW BACK PAIN WITHOUT SCIATICA: ICD-10-CM

## 2023-11-01 PROCEDURE — 72100 X-RAY EXAM L-S SPINE 2/3 VWS: CPT

## 2023-11-15 DIAGNOSIS — G89.29 CHRONIC BILATERAL LOW BACK PAIN, UNSPECIFIED WHETHER SCIATICA PRESENT: Primary | ICD-10-CM

## 2023-11-15 DIAGNOSIS — M54.50 CHRONIC BILATERAL LOW BACK PAIN, UNSPECIFIED WHETHER SCIATICA PRESENT: Primary | ICD-10-CM

## 2023-11-15 DIAGNOSIS — G89.29 CHRONIC SI JOINT PAIN: ICD-10-CM

## 2023-11-15 DIAGNOSIS — M53.3 CHRONIC SI JOINT PAIN: ICD-10-CM

## 2023-11-22 ENCOUNTER — HOSPITAL ENCOUNTER (OUTPATIENT)
Dept: PHYSICAL THERAPY | Age: 83
Setting detail: RECURRING SERIES
Discharge: HOME | End: 2023-11-25
Attending: INTERNAL MEDICINE
Payer: MEDICARE

## 2023-11-22 DIAGNOSIS — M62.81 MUSCLE WEAKNESS (GENERALIZED): ICD-10-CM

## 2023-11-22 DIAGNOSIS — M54.59 OTHER LOW BACK PAIN: Primary | ICD-10-CM

## 2023-11-22 PROCEDURE — 97161 PT EVAL LOW COMPLEX 20 MIN: CPT

## 2023-11-22 PROCEDURE — 97110 THERAPEUTIC EXERCISES: CPT

## 2023-11-22 NOTE — PROGRESS NOTES
Kenney Alejandra  : 1940  Primary: Medicare Part A And B  Secondary: [address] @ [address] [place] [id number]  Phone: [phone]  Fax: [phone] No data recorded  No data recorded    PT Visit Info:  No data recorded       OUTPATIENT PHYSICAL THERAPY 2023     Appt Desk   Episode   MyChart      Ms. Flowers Shadow was a no show for her evaluation      Tom Main, PT    Future Appointments   Date Time Provider [address] [place] [address]   2023  1:15 PM Merribeth Hamman, MD [facility]   2024  9:20 AM Gigi Mcdonough MD [facility]
[FreeTextEntry1] : Stable examination \par All labs \par No change in current medication
MD JOSE ANTONIO Burroughs [facility] AMB

## 2023-11-22 NOTE — THERAPY EVALUATION
referral,  Deion Weiner, PT     Referring Physician Signature: Ines Calero MD                    Post Session Pain  Charge Capture  PT Visit Info MD Guidelines  Janneth

## 2023-11-30 ENCOUNTER — HOSPITAL ENCOUNTER (OUTPATIENT)
Dept: PHYSICAL THERAPY | Age: 83
Setting detail: RECURRING SERIES
End: 2023-11-30
Attending: INTERNAL MEDICINE
Payer: MEDICARE

## 2023-11-30 PROCEDURE — 97110 THERAPEUTIC EXERCISES: CPT

## 2023-11-30 PROCEDURE — 97530 THERAPEUTIC ACTIVITIES: CPT

## 2023-11-30 NOTE — PROGRESS NOTES
Mari Garcia  : 1940  Primary: Medicare Part A And B (Medicare)  Secondary: [address] @ [address] [place] [address]  Phone: [phone]  Fax: [phone] Plan Frequency: 2x/wk for 6 week    Plan of Care/Certification Expiration Date: 24      >PT Visit Info:  Plan Frequency: 2x/wk for 6 week  Plan of Care/Certification Expiration Date: 24      Visit Count:  2    OUTPATIENT PHYSICAL THERAPY: Treatment Note 2023       Episode  }Appt Desk             Treatment Diagnosis:    No data found  Medical/Referring Diagnosis:      Referring Physician:  Ramu Adams MD MD Orders:  PT Eval and Treat   Date of Onset:  Onset Date: 23     Allergies:   Cephalexin  Restrictions/Precautions:  Restrictions/Precautions: None  No data recorded   Interventions Planned (Treatment may consist of any combination of the following):    Current Treatment Recommendations: Strengthening; ROM; Balance training; Functional mobility training; ADL/Self-care training; IADL training; Endurance training; Gait training; Stair training; Neuromuscular re-education; Pain management; Return to work related activity; Home exercise program; Safety education & training; Therapeutic activities     >Subjective Comments: pt reporting that she did not have any soreness after eval and plans to go line dancing today. >Initial:   ache   /10>Post Session:    ache    /10  Medications Last Reviewed:  2023  Updated Objective Findings: pt one rep max DL calculated at 47.8 lb today. Treatment     THERAPEUTIC EXERCISE: ( 17 minutes):    Exercises per grid below to improve mobility, strength, balance, and coordination. Required minimal visual, verbal, manual, and tactile cues to promote proper body mechanics. Progressed resistance and repetitions as indicated.      Date:  23 Date:  23 Date:     Activity/Exercise Parameters

## 2023-12-04 ENCOUNTER — HOSPITAL ENCOUNTER (OUTPATIENT)
Dept: PHYSICAL THERAPY | Age: 83
Setting detail: RECURRING SERIES
Discharge: HOME | End: 2023-12-07
Attending: INTERNAL MEDICINE
Payer: MEDICARE

## 2023-12-04 PROCEDURE — 97110 THERAPEUTIC EXERCISES: CPT

## 2023-12-04 PROCEDURE — 97530 THERAPEUTIC ACTIVITIES: CPT

## 2023-12-04 NOTE — PROGRESS NOTES
Parameters Parameters Parameters   Education Diagnosis, prognosis, POC, HEP, anatomy/physiology of condition Education on one rep max and target HR based on age for exercise, RPE scale    Treadmill  8 min, 1%, 2.0 mph   8 min, 2%, 2.5 mph     L stretch 5x     Cat/Cow <-> donaldo pose 5x     Warm up  L stretch x 10  Hamstring swoop x 10  Hip hinge x 15 Heel raises with wedge x 10  Hamstring swoops x10  G 2 OH x 5  -124                         THERAPEUTIC ACTIVITY: ( 23 minutes): Therapeutic activities per grid below to improve mobility, strength, coordination, and dynamic movement of upper body, lower body, and trunk to improve functional lifting, carrying, reaching, catching, and overhead activites. Required minimal visual, verbal, manual, and tactile cues to promote coordination of bilateral, upper extremity(s), lower extremity(s) and promote motor control of bilateral, upper extremity(s), lower extremity(s). Date:  11/30/23 Date:  12/4/23 Date:     Activity/Exercise Parameters Parameters Parameters   Dl KB  10 lb x 5  15 lb x 5   RPE  5-6  25 lb x 5 RPE 6-7  30 lb x 5 RPE 7  33lb AMRAP x 13  RPE 8  HR 98     STS  18 \", no UE  3 x 8 reps  -107  RPE 7-8    Step up and over  7 \", 5lb  2 x 8 reps    OH press  Dowel x 5  5 lb  3 x 5 reps                            HEP Log Date    L stretch, cat/cow, donaldo pose 11/22/2023   2.     3.    4.     5.            Treatment/Session Summary:      Treatment Assessment:     Pt to benefit from mobility drills to increase thoracic mobility as no able to attain end range OH press. Pt with mild imbalance with step up and over with turn incorporation and has to be intentional with foot placement. Incorporate pushing and pulling next session   Communication/Consultation:       Eval sent to MD, discuss and review POC and goals. Equipment provided today: HEP see log above.     Recommendations/Intent for next  treatment session:  Next visit will

## 2023-12-08 ENCOUNTER — HOSPITAL ENCOUNTER (OUTPATIENT)
Dept: PHYSICAL THERAPY | Age: 83
Setting detail: RECURRING SERIES
Discharge: HOME | End: 2023-12-11
Attending: INTERNAL MEDICINE
Payer: MEDICARE

## 2023-12-08 PROCEDURE — 97530 THERAPEUTIC ACTIVITIES: CPT

## 2023-12-08 PROCEDURE — 97110 THERAPEUTIC EXERCISES: CPT

## 2023-12-08 NOTE — PROGRESS NOTES
Harry Soto  : 1940  Primary: Medicare Part A And B (Medicare)  Secondary: [address] @ [address] [place] [id number]  Phone: [phone]  Fax: [phone] Plan Frequency: 2x/wk for 6 week    Plan of Care/Certification Expiration Date: 24      >PT Visit Info:  Plan Frequency: 2x/wk for 6 week  Plan of Care/Certification Expiration Date: 24      Visit Count:  4    OUTPATIENT PHYSICAL THERAPY: Treatment Note 2023       Episode  }Appt Desk             Treatment Diagnosis:    No data found  Medical/Referring Diagnosis:      Referring Physician:  Millicent Lang MD MD Orders:  PT Eval and Treat   Date of Onset:  Onset Date: 23     Allergies:   Cephalexin  Restrictions/Precautions:  Restrictions/Precautions: None  No data recorded   Interventions Planned (Treatment may consist of any combination of the following):    Current Treatment Recommendations: Strengthening; ROM; Balance training; Functional mobility training; ADL/Self-care training; IADL training; Endurance training; Gait training; Stair training; Neuromuscular re-education; Pain management; Return to work related activity; Home exercise program; Safety education & training; Therapeutic activities     >Subjective Comments: pt reporting that she did not have any soreness after eval and plans to go line dancing today. >Initial:   ache   /10>Post Session:    ache    /10  Medications Last Reviewed:  2023  Updated Objective Findings: pt one rep max DL calculated at 47.8 lb today. Treatment     THERAPEUTIC EXERCISE: ( 17 minutes):    Exercises per grid below to improve mobility, strength, balance, and coordination. Required minimal visual, verbal, manual, and tactile cues to promote proper body mechanics. Progressed resistance and repetitions as indicated.      Date:  23 Date:  23 Date:  23 Date:  23

## 2023-12-11 ENCOUNTER — OFFICE VISIT (OUTPATIENT)
Age: 83
End: 2023-12-11
Payer: MEDICARE

## 2023-12-11 VITALS
HEIGHT: 64 IN | HEART RATE: 88 BPM | SYSTOLIC BLOOD PRESSURE: 122 MMHG | BODY MASS INDEX: 26.02 KG/M2 | WEIGHT: 152.4 LBS | DIASTOLIC BLOOD PRESSURE: 70 MMHG

## 2023-12-11 DIAGNOSIS — R00.0 TACHYCARDIA: ICD-10-CM

## 2023-12-11 DIAGNOSIS — I10 ESSENTIAL HYPERTENSION: Primary | ICD-10-CM

## 2023-12-11 DIAGNOSIS — I34.1 MITRAL VALVE PROLAPSE: ICD-10-CM

## 2023-12-11 PROCEDURE — G8400 PT W/DXA NO RESULTS DOC: HCPCS | Performed by: INTERNAL MEDICINE

## 2023-12-11 PROCEDURE — G8427 DOCREV CUR MEDS BY ELIG CLIN: HCPCS | Performed by: INTERNAL MEDICINE

## 2023-12-11 PROCEDURE — G8417 CALC BMI ABV UP PARAM F/U: HCPCS | Performed by: INTERNAL MEDICINE

## 2023-12-11 PROCEDURE — 3074F SYST BP LT 130 MM HG: CPT | Performed by: INTERNAL MEDICINE

## 2023-12-11 PROCEDURE — 1036F TOBACCO NON-USER: CPT | Performed by: INTERNAL MEDICINE

## 2023-12-11 PROCEDURE — 3078F DIAST BP <80 MM HG: CPT | Performed by: INTERNAL MEDICINE

## 2023-12-11 PROCEDURE — 1123F ACP DISCUSS/DSCN MKR DOCD: CPT | Performed by: INTERNAL MEDICINE

## 2023-12-11 PROCEDURE — G8484 FLU IMMUNIZE NO ADMIN: HCPCS | Performed by: INTERNAL MEDICINE

## 2023-12-11 PROCEDURE — 99213 OFFICE O/P EST LOW 20 MIN: CPT | Performed by: INTERNAL MEDICINE

## 2023-12-11 PROCEDURE — 1090F PRES/ABSN URINE INCON ASSESS: CPT | Performed by: INTERNAL MEDICINE

## 2023-12-11 ASSESSMENT — ENCOUNTER SYMPTOMS
NAIL CHANGES: 0
EYE PAIN: 0
ABDOMINAL PAIN: 0
APHONIA: 0
COUGH: 0
STRIDOR: 0

## 2023-12-11 NOTE — PROGRESS NOTES
[address] [facility], [facility], [address]  PHONE: [phone]    SUBJECTIVE:   Danilo Brock is a 81 y.o. female 1940      Chief Complaint   Patient presents with    Tachycardia        Tachycardia   . History of present illness: 81 y.o. female The patient presented for follow up 12/11/23. Rates better worse monitor for palpations. Events less often over the several weeks. Interval history:  History of SVT status post SVT ablation 2018 additional history of provoked DVT on chronic anticoagulation therapy. Patient was seen previously for episodes of palpitations and a wearable device was discussed. Since that time worsening symptoms of palpitations history of MVP     Cardiac History:  2018 ablation of supraventricular tachycardia  2018 echocardiogram normal left ventricular systolic function mitral valve prolapse mild MR  10/31/2022 echocardiogram ejection fraction 55 to 60% mildly increased wall thickness left atrium dilated diastolic dysfunction  27/27/6575 cardiac monitor min 54 Max 200 average 85 bpm 1 run of wide-complex tachycardia lasting 6 beats atrial and ventricular ectopic beats noted  10/2023 Patient had a min HR of 54 bpm, max HR of 200 bpm, and avg HR of 85 bpm.  Predominant underlying rhythm was Sinus Rhythm. 1 run of wide complex tachycardia occurred lasting 6 beats with a max rate of 179 bpm (avg 150 bpm). Supraventricular Tachycardia runs occurred, the run with the fastest interval lastin7 beats with a max rate of 200 bpm, the longest lasting 29.0 secs with an avg ratof 113 bpm. Isolated SVEs were rare (<1.0%), SVE Couplets were rare (<1.0%), anSVE Triplets were rare (<1.0%). Isolated VEs were rare (<1.0%, 5405), VE Coupletwere rare (<1.0%, 20), and VE Triplets were rare (<1.0%, 1). Ventricular Bigeminyand Trigeminy were present. Assessment:  Tachycardia  Holter with no arrhythmia noted elevated rates with ectopy.    Hypertension  Key CAD CHF Meds

## 2023-12-12 ENCOUNTER — HOSPITAL ENCOUNTER (OUTPATIENT)
Dept: PHYSICAL THERAPY | Age: 83
Setting detail: RECURRING SERIES
Discharge: HOME | End: 2023-12-15
Attending: INTERNAL MEDICINE
Payer: MEDICARE

## 2023-12-12 PROCEDURE — 97110 THERAPEUTIC EXERCISES: CPT

## 2023-12-12 PROCEDURE — 97530 THERAPEUTIC ACTIVITIES: CPT

## 2024-01-29 ENCOUNTER — OFFICE VISIT (OUTPATIENT)
Dept: INTERNAL MEDICINE CLINIC | Facility: CLINIC | Age: 84
End: 2024-01-29
Payer: MEDICARE

## 2024-01-29 VITALS
OXYGEN SATURATION: 100 % | BODY MASS INDEX: 25.78 KG/M2 | SYSTOLIC BLOOD PRESSURE: 138 MMHG | HEART RATE: 61 BPM | HEIGHT: 64 IN | DIASTOLIC BLOOD PRESSURE: 74 MMHG | TEMPERATURE: 97 F | WEIGHT: 151 LBS

## 2024-01-29 DIAGNOSIS — R35.0 URINARY FREQUENCY: ICD-10-CM

## 2024-01-29 DIAGNOSIS — N39.0 URINARY TRACT INFECTION WITHOUT HEMATURIA, SITE UNSPECIFIED: Primary | ICD-10-CM

## 2024-01-29 DIAGNOSIS — R30.0 DYSURIA: ICD-10-CM

## 2024-01-29 DIAGNOSIS — Z00.00 MEDICARE ANNUAL WELLNESS VISIT, SUBSEQUENT: ICD-10-CM

## 2024-01-29 LAB
BILIRUBIN, URINE, POC: NEGATIVE
BLOOD URINE, POC: ABNORMAL
GLUCOSE URINE, POC: NEGATIVE
KETONES, URINE, POC: NEGATIVE
LEUKOCYTE ESTERASE, URINE, POC: ABNORMAL
NITRITE, URINE, POC: NEGATIVE
PH, URINE, POC: 6 (ref 4.6–8)
PROTEIN,URINE, POC: 100
SPECIFIC GRAVITY, URINE, POC: 1.02 (ref 1–1.03)
URINALYSIS CLARITY, POC: ABNORMAL
URINALYSIS COLOR, POC: ABNORMAL
UROBILINOGEN, POC: ABNORMAL

## 2024-01-29 PROCEDURE — 1123F ACP DISCUSS/DSCN MKR DOCD: CPT | Performed by: NURSE PRACTITIONER

## 2024-01-29 PROCEDURE — 81003 URINALYSIS AUTO W/O SCOPE: CPT | Performed by: NURSE PRACTITIONER

## 2024-01-29 PROCEDURE — 1036F TOBACCO NON-USER: CPT | Performed by: NURSE PRACTITIONER

## 2024-01-29 PROCEDURE — G8484 FLU IMMUNIZE NO ADMIN: HCPCS | Performed by: NURSE PRACTITIONER

## 2024-01-29 PROCEDURE — G8400 PT W/DXA NO RESULTS DOC: HCPCS | Performed by: NURSE PRACTITIONER

## 2024-01-29 PROCEDURE — G0439 PPPS, SUBSEQ VISIT: HCPCS | Performed by: NURSE PRACTITIONER

## 2024-01-29 PROCEDURE — 3075F SYST BP GE 130 - 139MM HG: CPT | Performed by: NURSE PRACTITIONER

## 2024-01-29 PROCEDURE — 99213 OFFICE O/P EST LOW 20 MIN: CPT | Performed by: NURSE PRACTITIONER

## 2024-01-29 PROCEDURE — 1090F PRES/ABSN URINE INCON ASSESS: CPT | Performed by: NURSE PRACTITIONER

## 2024-01-29 PROCEDURE — 3078F DIAST BP <80 MM HG: CPT | Performed by: NURSE PRACTITIONER

## 2024-01-29 PROCEDURE — G8427 DOCREV CUR MEDS BY ELIG CLIN: HCPCS | Performed by: NURSE PRACTITIONER

## 2024-01-29 PROCEDURE — G8417 CALC BMI ABV UP PARAM F/U: HCPCS | Performed by: NURSE PRACTITIONER

## 2024-01-29 RX ORDER — NITROFURANTOIN 25; 75 MG/1; MG/1
100 CAPSULE ORAL 2 TIMES DAILY
Qty: 14 CAPSULE | Refills: 0 | Status: SHIPPED | OUTPATIENT
Start: 2024-01-29 | End: 2024-02-05

## 2024-01-29 ASSESSMENT — PATIENT HEALTH QUESTIONNAIRE - PHQ9
SUM OF ALL RESPONSES TO PHQ9 QUESTIONS 1 & 2: 0
SUM OF ALL RESPONSES TO PHQ QUESTIONS 1-9: 0
1. LITTLE INTEREST OR PLEASURE IN DOING THINGS: 0
SUM OF ALL RESPONSES TO PHQ QUESTIONS 1-9: 0
2. FEELING DOWN, DEPRESSED OR HOPELESS: 0

## 2024-01-29 ASSESSMENT — LIFESTYLE VARIABLES
HOW MANY STANDARD DRINKS CONTAINING ALCOHOL DO YOU HAVE ON A TYPICAL DAY: PATIENT DOES NOT DRINK
HOW OFTEN DO YOU HAVE A DRINK CONTAINING ALCOHOL: NEVER

## 2024-01-29 NOTE — PROGRESS NOTES
Sully Harris (: 1940) presents today c/o urinary frequency and dysuria x 4-5 days. Afebrile. She has been taking AZO with some improvement.     Chief Complaint   Patient presents with    Urinary Frequency    Dysuria    Forms     Complete forms for assisted living      Medicare AWV     Patient Active Problem List   Diagnosis    Osteopenia    Glaucoma    History of left breast cancer    History of right breast cancer    Abnormal EKG    Chronic deep vein thrombosis (DVT) of distal vein of both lower extremities (HCC)    Essential hypertension    Other primary thrombophilia (HCC)    Malignant neoplasm of overlapping sites of left female breast, unspecified estrogen receptor status (HCC)    Hx of supraventricular tachycardia      Reviewed and updated this visit by provider:  Tobacco  Allergies  Meds  Problems  Med Hx  Surg Hx  Fam Hx         Immunizations:  Immunization status: up to date and documented.    Review of Systems - Negative except as stated in HPI  History obtained from chart review and the patient  General ROS: negative for - fever  Gastrointestinal ROS: no abdominal pain, change in bowel habits, or black or bloody stools  Genito-Urinary ROS: positive for - dysuria and urinary frequency/urgency  negative for - hematuria    Visit Vitals  /74 (Site: Left Upper Arm, Position: Sitting)   Pulse 61   Temp 97 °F (36.1 °C) (Temporal)   Ht 1.626 m (5' 4\")   Wt 68.5 kg (151 lb)   SpO2 100%   BMI 25.92 kg/m²     Physical Examination: General appearance - alert, well appearing, and in no distress, oriented to person, place, and time, normal appearing weight, and acyanotic, in no respiratory distress  Mental status - alert, oriented to person, place, and time, normal mood, behavior, speech, dress, motor activity, and thought processes, affect appropriate to mood  Chest - clear to auscultation, no wheezes, rales or rhonchi, symmetric air entry  Heart - normal rate, regular rhythm, normal S1,

## 2024-01-29 NOTE — PATIENT INSTRUCTIONS
Patient Education        Urinary Tract Infection (UTI) in Women: Care Instructions  Overview     A urinary tract infection (UTI) is an infection caused by bacteria. It can happen anywhere in the urinary tract. A UTI can happen in the:  Kidneys.  Ureters, the tubes that connect the kidneys to the bladder.  Bladder.  Urethra, where the urine comes out.  Most UTIs are bladder infections. They often cause pain or burning when you urinate.  Most UTIs can be cured with antibiotics. If you are prescribed antibiotics, be sure to complete your treatment so that the infection does not get worse.  Follow-up care is a key part of your treatment and safety. Be sure to make and go to all appointments, and call your doctor if you are having problems. It's also a good idea to know your test results and keep a list of the medicines you take.  How can you care for yourself at home?  Take your antibiotics as directed. Do not stop taking them just because you feel better. You need to take the full course of antibiotics.  Drink extra water and other fluids for the next day or two. This will help make the urine less concentrated and help wash out the bacteria that are causing the infection. (If you have kidney, heart, or liver disease and have to limit fluids, talk with your doctor before you increase the amount of fluids you drink.)  Avoid drinks that are carbonated or have caffeine. They can irritate the bladder.  Urinate often. Try to empty your bladder each time.  To relieve pain, take a hot bath or lay a heating pad set on low over your lower belly or genital area. Never go to sleep with a heating pad in place.  To prevent UTIs  Drink plenty of water each day. This helps you urinate often, which clears bacteria from your system. (If you have kidney, heart, or liver disease and have to limit fluids, talk with your doctor before you increase the amount of fluids you drink.)  Urinate when you need to.  If you are sexually active,

## 2024-02-01 RX ORDER — ATENOLOL 25 MG/1
TABLET ORAL
Qty: 90 TABLET | Refills: 2 | Status: SHIPPED | OUTPATIENT
Start: 2024-02-01

## 2024-02-01 RX ORDER — ATENOLOL 25 MG/1
25 TABLET ORAL DAILY
Qty: 90 TABLET | Refills: 2 | Status: CANCELLED | OUTPATIENT
Start: 2024-02-01

## 2024-02-01 RX ORDER — APIXABAN 2.5 MG/1
TABLET, FILM COATED ORAL
Qty: 180 TABLET | Refills: 2 | Status: SHIPPED | OUTPATIENT
Start: 2024-02-01

## 2024-02-02 LAB
BACTERIA SPEC CULT: ABNORMAL
BACTERIA SPEC CULT: ABNORMAL
SERVICE CMNT-IMP: ABNORMAL

## 2024-03-11 ENCOUNTER — TELEPHONE (OUTPATIENT)
Dept: INTERNAL MEDICINE CLINIC | Facility: CLINIC | Age: 84
End: 2024-03-11

## 2024-03-11 NOTE — TELEPHONE ENCOUNTER
----- Message from Rina Drew sent at 3/11/2024  4:20 PM EDT -----  Subject: Message to Provider    QUESTIONS  Information for Provider? Patient is preparing to move into an apartment   style independent living facility. The patient has some forms that need to   be completed as part of the move. Does she need an appt or can she simply   walk the forms in? The form is called a \"Physician Statement\". Please let   the patient know best way to accomplish this. She thinks the form is   self-explanatory. She will also need a TB screening within 90 days of   move-in (date still TBD).   ---------------------------------------------------------------------------  --------------  CALL BACK INFO  [phone]; OK to leave message on voicemail,OK to respond with electronic   message via [email or web address] portal (only for patients who have registered [email or web address]   account)  ---------------------------------------------------------------------------  --------------  SCRIPT ANSWERS  Relationship to Patient? Self

## 2024-05-03 ENCOUNTER — OFFICE VISIT (OUTPATIENT)
Dept: INTERNAL MEDICINE CLINIC | Facility: CLINIC | Age: 84
End: 2024-05-03
Payer: MEDICARE

## 2024-05-03 VITALS
SYSTOLIC BLOOD PRESSURE: 128 MMHG | HEIGHT: 64 IN | WEIGHT: 156.2 LBS | DIASTOLIC BLOOD PRESSURE: 64 MMHG | BODY MASS INDEX: 26.67 KG/M2

## 2024-05-03 DIAGNOSIS — Z85.3 HISTORY OF LEFT BREAST CANCER: ICD-10-CM

## 2024-05-03 DIAGNOSIS — I10 ESSENTIAL HYPERTENSION: ICD-10-CM

## 2024-05-03 DIAGNOSIS — K57.90 DIVERTICULOSIS: ICD-10-CM

## 2024-05-03 DIAGNOSIS — I82.5Z3 CHRONIC DEEP VEIN THROMBOSIS (DVT) OF DISTAL VEIN OF BOTH LOWER EXTREMITIES (HCC): Primary | ICD-10-CM

## 2024-05-03 DIAGNOSIS — S80.211A ABRASION OF RIGHT KNEE, INITIAL ENCOUNTER: ICD-10-CM

## 2024-05-03 DIAGNOSIS — C50.812 MALIGNANT NEOPLASM OF OVERLAPPING SITES OF LEFT FEMALE BREAST, UNSPECIFIED ESTROGEN RECEPTOR STATUS (HCC): ICD-10-CM

## 2024-05-03 DIAGNOSIS — M85.80 OSTEOPENIA, UNSPECIFIED LOCATION: ICD-10-CM

## 2024-05-03 DIAGNOSIS — D68.59 OTHER PRIMARY THROMBOPHILIA (HCC): ICD-10-CM

## 2024-05-03 DIAGNOSIS — K44.9 HIATAL HERNIA: ICD-10-CM

## 2024-05-03 PROCEDURE — 1036F TOBACCO NON-USER: CPT | Performed by: INTERNAL MEDICINE

## 2024-05-03 PROCEDURE — G8417 CALC BMI ABV UP PARAM F/U: HCPCS | Performed by: INTERNAL MEDICINE

## 2024-05-03 PROCEDURE — 99214 OFFICE O/P EST MOD 30 MIN: CPT | Performed by: INTERNAL MEDICINE

## 2024-05-03 PROCEDURE — G8427 DOCREV CUR MEDS BY ELIG CLIN: HCPCS | Performed by: INTERNAL MEDICINE

## 2024-05-03 PROCEDURE — 3078F DIAST BP <80 MM HG: CPT | Performed by: INTERNAL MEDICINE

## 2024-05-03 PROCEDURE — 1123F ACP DISCUSS/DSCN MKR DOCD: CPT | Performed by: INTERNAL MEDICINE

## 2024-05-03 PROCEDURE — G8400 PT W/DXA NO RESULTS DOC: HCPCS | Performed by: INTERNAL MEDICINE

## 2024-05-03 PROCEDURE — 3074F SYST BP LT 130 MM HG: CPT | Performed by: INTERNAL MEDICINE

## 2024-05-03 PROCEDURE — 1090F PRES/ABSN URINE INCON ASSESS: CPT | Performed by: INTERNAL MEDICINE

## 2024-05-03 RX ORDER — AMOXICILLIN 500 MG/1
500 CAPSULE ORAL
COMMUNITY
Start: 2024-02-23

## 2024-05-03 RX ORDER — ABEMACICLIB 100 MG/1
100 TABLET ORAL 2 TIMES DAILY
COMMUNITY

## 2024-05-03 SDOH — ECONOMIC STABILITY: FOOD INSECURITY: WITHIN THE PAST 12 MONTHS, THE FOOD YOU BOUGHT JUST DIDN'T LAST AND YOU DIDN'T HAVE MONEY TO GET MORE.: NEVER TRUE

## 2024-05-03 SDOH — ECONOMIC STABILITY: FOOD INSECURITY: WITHIN THE PAST 12 MONTHS, YOU WORRIED THAT YOUR FOOD WOULD RUN OUT BEFORE YOU GOT MONEY TO BUY MORE.: NEVER TRUE

## 2024-05-03 SDOH — ECONOMIC STABILITY: INCOME INSECURITY: HOW HARD IS IT FOR YOU TO PAY FOR THE VERY BASICS LIKE FOOD, HOUSING, MEDICAL CARE, AND HEATING?: NOT HARD AT ALL

## 2024-05-03 ASSESSMENT — ENCOUNTER SYMPTOMS: SHORTNESS OF BREATH: 0

## 2024-05-03 NOTE — PROGRESS NOTES
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 and has an abrasion of her right knee    Problem List:  Patient Active Problem List   Diagnosis   • Osteopenia   • Glaucoma   • History of left breast cancer   • History of right breast cancer   • Abnormal EKG   • Chronic deep vein thrombosis (DVT) of distal vein of both lower extremities (HCC)   • Essential hypertension   • Other primary thrombophilia (HCC)   • Malignant neoplasm of overlapping sites of left female breast, unspecified estrogen receptor status (HCC)   • Hx of supraventricular tachycardia   • Diverticulosis   • Hiatal hernia       History:  Past Medical History:   Diagnosis Date   • Arthritis    • Cancer (HCC)     breast cancer x 2   • Diverticulosis    • DVT of lower extremity (deep venous thrombosis) (HCC)     left post op -long term tx with Eliquis   • Essential hypertension 3/18/2019   • Glaucoma 2018    Revision eye care   • Hearing loss in left ear    • History of right breast cancer    • History of supraventricular tachycardia 2018    s/p Ablation   • Hx of bone density study 2020    osteopenia   • Hx of breast cancer 2016    left   • Hx of complete eye exam 2019    due every 3mos- next appt May 7- Dr. Mayberry   • Hx of echocardiogram 2103    mild MVP,mild LAE, EF of 58%   • Osteopenia 09/10/2018    by Bone Density       Allergies:  Allergies   Allergen Reactions   • Cephalexin Rash       Current Medications:  Current Outpatient Medications   Medication Sig Dispense Refill   • amoxicillin (AMOXIL) 500 MG capsule Take 1 capsule by mouth Take four capsules one hour before appointment.     • Abemaciclib (VERZENIO) 100 MG TABS 100 mg in the morning and at bedtime     • silver sulfADIAZINE

## 2024-05-16 RX ORDER — ALENDRONATE SODIUM 70 MG/1
TABLET ORAL
Qty: 12 TABLET | Refills: 0 | Status: SHIPPED | OUTPATIENT
Start: 2024-05-16

## 2024-06-21 ENCOUNTER — OFFICE VISIT (OUTPATIENT)
Dept: INTERNAL MEDICINE CLINIC | Facility: CLINIC | Age: 84
End: 2024-06-21
Payer: MEDICARE

## 2024-06-21 VITALS
WEIGHT: 151.4 LBS | SYSTOLIC BLOOD PRESSURE: 110 MMHG | BODY MASS INDEX: 25.85 KG/M2 | HEIGHT: 64 IN | DIASTOLIC BLOOD PRESSURE: 60 MMHG

## 2024-06-21 DIAGNOSIS — H92.02 LEFT EAR PAIN: Primary | ICD-10-CM

## 2024-06-21 DIAGNOSIS — I10 ESSENTIAL HYPERTENSION: ICD-10-CM

## 2024-06-21 PROCEDURE — G8400 PT W/DXA NO RESULTS DOC: HCPCS | Performed by: INTERNAL MEDICINE

## 2024-06-21 PROCEDURE — 1090F PRES/ABSN URINE INCON ASSESS: CPT | Performed by: INTERNAL MEDICINE

## 2024-06-21 PROCEDURE — 3078F DIAST BP <80 MM HG: CPT | Performed by: INTERNAL MEDICINE

## 2024-06-21 PROCEDURE — G8427 DOCREV CUR MEDS BY ELIG CLIN: HCPCS | Performed by: INTERNAL MEDICINE

## 2024-06-21 PROCEDURE — 1123F ACP DISCUSS/DSCN MKR DOCD: CPT | Performed by: INTERNAL MEDICINE

## 2024-06-21 PROCEDURE — 99213 OFFICE O/P EST LOW 20 MIN: CPT | Performed by: INTERNAL MEDICINE

## 2024-06-21 PROCEDURE — 1036F TOBACCO NON-USER: CPT | Performed by: INTERNAL MEDICINE

## 2024-06-21 PROCEDURE — G8417 CALC BMI ABV UP PARAM F/U: HCPCS | Performed by: INTERNAL MEDICINE

## 2024-06-21 PROCEDURE — 3074F SYST BP LT 130 MM HG: CPT | Performed by: INTERNAL MEDICINE

## 2024-06-21 RX ORDER — AMOXICILLIN 500 MG/1
500 CAPSULE ORAL 2 TIMES DAILY
Qty: 14 CAPSULE | Refills: 0 | Status: SHIPPED | OUTPATIENT
Start: 2024-06-21

## 2024-06-21 ASSESSMENT — ENCOUNTER SYMPTOMS
FACIAL SWELLING: 1
SHORTNESS OF BREATH: 0
COUGH: 0

## 2024-06-21 NOTE — PROGRESS NOTES
HPI: Sully Harris (: 1940)    For a few days has had pain in her left ear and jaw area with swelling    Denies dental issues    Ear ache kept her awake last pm      Problem List:  Patient Active Problem List   Diagnosis    Osteopenia    Glaucoma    History of left breast cancer    History of right breast cancer    Abnormal EKG    Chronic deep vein thrombosis (DVT) of distal vein of both lower extremities (HCC)    Essential hypertension    Other primary thrombophilia (HCC)    Malignant neoplasm of overlapping sites of left female breast, unspecified estrogen receptor status (HCC)    Hx of supraventricular tachycardia    Diverticulosis    Hiatal hernia       History:  Past Medical History:   Diagnosis Date    Arthritis     Cancer (HCC)     breast cancer x 2    Diverticulosis     DVT of lower extremity (deep venous thrombosis) (HCC)     left post op -long term tx with Eliquis    Essential hypertension 3/18/2019    Glaucoma 2018    Revision eye care    Hearing loss in left ear     History of right breast cancer     History of supraventricular tachycardia 2018    s/p Ablation    Hx of bone density study 2020    osteopenia    Hx of breast cancer 2016    left    Hx of complete eye exam     due every 3mos- next appt May 7- Dr. Mayberry    Hx of echocardiogram 2103    mild MVP,mild LAE, EF of 58%    Osteopenia 09/10/2018    by Bone Density       Allergies:  Allergies   Allergen Reactions    Cephalexin Rash       Current Medications:  Current Outpatient Medications   Medication Sig Dispense Refill    amoxicillin (AMOXIL) 500 MG capsule Take 1 capsule by mouth 2 times daily Take four capsules one hour before appointment. 14 capsule 0    alendronate (FOSAMAX) 70 MG tablet Take 1 tablet by mouth once a week 12 tablet 0    Abemaciclib (VERZENIO) 100 MG TABS 100 mg in the morning and at bedtime      silver sulfADIAZINE (SILVADENE) 1 % cream Apply topically daily. 25 g 0    ELIQUIS 2.5 MG TABS

## 2024-07-05 ENCOUNTER — APPOINTMENT (OUTPATIENT)
Dept: ULTRASOUND IMAGING | Age: 84
End: 2024-07-05
Payer: MEDICARE

## 2024-07-05 ENCOUNTER — HOSPITAL ENCOUNTER (EMERGENCY)
Age: 84
Discharge: HOME | End: 2024-07-05
Payer: MEDICARE

## 2024-07-05 VITALS
BODY MASS INDEX: 26.12 KG/M2 | HEIGHT: 64 IN | DIASTOLIC BLOOD PRESSURE: 78 MMHG | HEART RATE: 88 BPM | WEIGHT: 153 LBS | OXYGEN SATURATION: 96 % | RESPIRATION RATE: 16 BRPM | SYSTOLIC BLOOD PRESSURE: 136 MMHG | TEMPERATURE: 98 F

## 2024-07-05 DIAGNOSIS — M71.21 SYNOVIAL CYST OF RIGHT POPLITEAL SPACE: Primary | ICD-10-CM

## 2024-07-05 PROCEDURE — 99284 EMERGENCY DEPT VISIT MOD MDM: CPT

## 2024-07-05 PROCEDURE — 93971 EXTREMITY STUDY: CPT

## 2024-07-05 ASSESSMENT — PAIN DESCRIPTION - ORIENTATION: ORIENTATION: RIGHT

## 2024-07-05 ASSESSMENT — PAIN SCALES - GENERAL
PAINLEVEL_OUTOF10: 6
PAINLEVEL_OUTOF10: 3

## 2024-07-05 ASSESSMENT — LIFESTYLE VARIABLES
HOW MANY STANDARD DRINKS CONTAINING ALCOHOL DO YOU HAVE ON A TYPICAL DAY: 1 OR 2
HOW OFTEN DO YOU HAVE A DRINK CONTAINING ALCOHOL: MONTHLY OR LESS

## 2024-07-05 ASSESSMENT — PAIN DESCRIPTION - LOCATION: LOCATION: KNEE

## 2024-07-05 ASSESSMENT — PAIN - FUNCTIONAL ASSESSMENT
PAIN_FUNCTIONAL_ASSESSMENT: 0-10
PAIN_FUNCTIONAL_ASSESSMENT: 0-10

## 2024-07-05 NOTE — ED PROVIDER NOTES
Emergency Department Provider Note       PCP: Sandee Lema MD   Age: 83 y.o.   Sex: female     DISPOSITION Decision To Discharge 07/05/2024 07:11:03 PM       ICD-10-CM    1. Synovial cyst of right popliteal space  M71.21           Medical Decision Making     83-year-old female presenting to the emergency department for evaluation of pain behind her right knee, no trauma but history of DVT.  Her ultrasound today does not show any evidence of DVT, it does show a Baker's cyst.  On exam there is no significant swelling or erythema to the knee joint.  She is able to ambulate.  Discussed results with patient, can follow-up with PCP.  Return to the emergency department for any new or concerning symptoms     1 acute, uncomplicated illness or injury.  Shared medical decision making was utilized in creating the patients health plan today.    I independently ordered and reviewed each unique test.  I reviewed external records: provider visit note from PCP.     I interpreted the Ultrasound  I reviewed imaging and radiology report.              History     83-year-old female presenting to the emergency department today for evaluation of pain and swelling behind her right knee pain.  She noticed the symptoms this afternoon after she ate lunch.  She denies any known injury or trauma.  She reports a history of DVT several years ago.  She is currently on Eliquis daily and has not missed any doses.  Denies any recent travel or surgeries.  She is currently being treated for breast cancer with Verzenio.  She has not noticed any pain or swelling to her calf.  She does wear compression stockings daily she denies shortness of breath or chest pain    The history is provided by the patient. No  was used.     Physical Exam     Vitals signs and nursing note reviewed:  Vitals:    07/05/24 1652   BP: (!) 148/88   Pulse: 86   Resp: 17   Temp: 97.9 °F (36.6 °C)   TempSrc: Oral   SpO2: 98%   Weight: 69.4 kg (153 lb)

## 2024-07-05 NOTE — ED TRIAGE NOTES
Pt ambulatory to triage with steady gait. Pt c/o pain behind R knee that started today along with a lump. Pt with hx of DVT and leaving for vacation next week, so wanted to be checked to rule out DVT. Pt currently on Eliquis twice daily. Pt denies any injury or trauma to her R knee or leg. Pt in NAD during triage.

## 2024-07-05 NOTE — DISCHARGE INSTRUCTIONS
As discussed your ultrasound today did not show any signs of a DVT.  It did show a Baker's cyst which is likely the swelling that you are feeling in the back of your knee.  Typically these do not require any treatment unless they are causing pain are becoming bothersome    Follow-up with your PCP in 2-3 days for recheck.  Return to the ER for any new or worsening symptoms.

## 2024-07-05 NOTE — ED NOTES
Pt c/o right knee pain for several days.  States that she has a lump behind her knee and is concerned about having a DVT

## 2024-07-29 RX ORDER — ALENDRONATE SODIUM 70 MG/1
TABLET ORAL
Qty: 12 TABLET | Refills: 2 | Status: SHIPPED | OUTPATIENT
Start: 2024-07-29

## 2024-09-14 ENCOUNTER — HOSPITAL ENCOUNTER (EMERGENCY)
Age: 84
Discharge: HOME | End: 2024-09-14
Attending: EMERGENCY MEDICINE
Payer: MEDICARE

## 2024-09-14 ENCOUNTER — APPOINTMENT (OUTPATIENT)
Dept: GENERAL RADIOLOGY | Age: 84
End: 2024-09-14
Payer: MEDICARE

## 2024-09-14 VITALS
RESPIRATION RATE: 15 BRPM | DIASTOLIC BLOOD PRESSURE: 58 MMHG | SYSTOLIC BLOOD PRESSURE: 110 MMHG | HEART RATE: 65 BPM | WEIGHT: 153.44 LBS | HEIGHT: 64 IN | TEMPERATURE: 97.7 F | BODY MASS INDEX: 26.2 KG/M2 | OXYGEN SATURATION: 95 %

## 2024-09-14 DIAGNOSIS — I47.10 PAROXYSMAL SUPRAVENTRICULAR TACHYCARDIA (HCC): Primary | ICD-10-CM

## 2024-09-14 LAB
ALBUMIN SERPL-MCNC: 4 G/DL (ref 3.2–4.6)
ALBUMIN/GLOB SERPL: 1.6 (ref 0.4–1.6)
ALP SERPL-CCNC: 58 U/L (ref 45–117)
ALT SERPL-CCNC: 10 U/L (ref 13–61)
ANION GAP SERPL CALC-SCNC: 12 MMOL/L (ref 9–18)
AST SERPL-CCNC: 20 U/L (ref 15–37)
BASOPHILS # BLD: 0 K/UL (ref 0–0.2)
BASOPHILS NFR BLD: 1 % (ref 0–2)
BILIRUB SERPL-MCNC: 0.5 MG/DL (ref 0.2–1.1)
BUN SERPL-MCNC: 15 MG/DL (ref 8–23)
CALCIUM SERPL-MCNC: 9.5 MG/DL (ref 8.3–10.4)
CHLORIDE SERPL-SCNC: 104 MMOL/L (ref 98–107)
CO2 SERPL-SCNC: 26 MMOL/L (ref 21–32)
CREAT SERPL-MCNC: 0.85 MG/DL (ref 0.6–1)
DIFFERENTIAL METHOD BLD: ABNORMAL
EKG ATRIAL RATE: 0 BPM
EKG DIAGNOSIS: NORMAL
EKG P AXIS: 0 DEGREES
EKG P-R INTERVAL: 68 MS
EKG Q-T INTERVAL: 314 MS
EKG QRS DURATION: 93 MS
EKG QTC CALCULATION (BAZETT): 483 MS
EKG R AXIS: -54 DEGREES
EKG T AXIS: 34 DEGREES
EKG VENTRICULAR RATE: 142 BPM
EOSINOPHIL # BLD: 0.1 K/UL (ref 0–0.8)
EOSINOPHIL NFR BLD: 1 % (ref 0.5–7.8)
ERYTHROCYTE [DISTWIDTH] IN BLOOD BY AUTOMATED COUNT: 13.6 % (ref 11.9–14.6)
GLOBULIN SER CALC-MCNC: 2.5 G/DL (ref 2.8–4.5)
GLUCOSE SERPL-MCNC: 163 MG/DL (ref 65–100)
HCT VFR BLD AUTO: 38.4 % (ref 35.8–46.3)
HGB BLD-MCNC: 12.5 G/DL (ref 11.7–15.4)
IMM GRANULOCYTES # BLD AUTO: 0 K/UL (ref 0–0.5)
IMM GRANULOCYTES NFR BLD AUTO: 0 % (ref 0–5)
LYMPHOCYTES # BLD: 1.5 K/UL (ref 0.5–4.6)
LYMPHOCYTES NFR BLD: 32 % (ref 13–44)
MCH RBC QN AUTO: 36 PG (ref 26.1–32.9)
MCHC RBC AUTO-ENTMCNC: 32.6 G/DL (ref 31.4–35)
MCV RBC AUTO: 110.7 FL (ref 82–102)
MONOCYTES # BLD: 0.4 K/UL (ref 0.1–1.3)
MONOCYTES NFR BLD: 9 % (ref 4–12)
NEUTS SEG # BLD: 2.6 K/UL (ref 1.7–8.2)
NEUTS SEG NFR BLD: 57 % (ref 43–78)
NRBC # BLD: 0 K/UL (ref 0–0.2)
PLATELET # BLD AUTO: 181 K/UL (ref 150–450)
PMV BLD AUTO: 11.9 FL (ref 9.4–12.3)
POTASSIUM SERPL-SCNC: 3.8 MMOL/L (ref 3.5–5.1)
PROT SERPL-MCNC: 6.5 G/DL (ref 6.4–8.2)
RBC # BLD AUTO: 3.47 M/UL (ref 4.05–5.2)
SODIUM SERPL-SCNC: 142 MMOL/L (ref 133–143)
TROPONIN T SERPL HS-MCNC: 16.8 NG/L (ref 0–14)
TROPONIN T SERPL HS-MCNC: 19.6 NG/L (ref 0–14)
WBC # BLD AUTO: 4.6 K/UL (ref 4.3–11.1)

## 2024-09-14 PROCEDURE — 93010 ELECTROCARDIOGRAM REPORT: CPT | Performed by: INTERNAL MEDICINE

## 2024-09-14 PROCEDURE — 2500000003 HC RX 250 WO HCPCS: Performed by: EMERGENCY MEDICINE

## 2024-09-14 PROCEDURE — 93005 ELECTROCARDIOGRAM TRACING: CPT | Performed by: EMERGENCY MEDICINE

## 2024-09-14 PROCEDURE — 80053 COMPREHEN METABOLIC PANEL: CPT

## 2024-09-14 PROCEDURE — 71045 X-RAY EXAM CHEST 1 VIEW: CPT

## 2024-09-14 PROCEDURE — 84484 ASSAY OF TROPONIN QUANT: CPT

## 2024-09-14 PROCEDURE — 99285 EMERGENCY DEPT VISIT HI MDM: CPT

## 2024-09-14 PROCEDURE — 85025 COMPLETE CBC W/AUTO DIFF WBC: CPT

## 2024-09-14 PROCEDURE — 96374 THER/PROPH/DIAG INJ IV PUSH: CPT

## 2024-09-14 RX ORDER — DILTIAZEM HYDROCHLORIDE 5 MG/ML
10 INJECTION INTRAVENOUS
Status: COMPLETED | OUTPATIENT
Start: 2024-09-14 | End: 2024-09-14

## 2024-09-14 RX ADMIN — DILTIAZEM HYDROCHLORIDE 10 MG: 5 INJECTION, SOLUTION INTRAVENOUS at 15:03

## 2024-09-14 ASSESSMENT — LIFESTYLE VARIABLES
HOW MANY STANDARD DRINKS CONTAINING ALCOHOL DO YOU HAVE ON A TYPICAL DAY: 1 OR 2
HOW OFTEN DO YOU HAVE A DRINK CONTAINING ALCOHOL: 2-4 TIMES A MONTH

## 2024-09-14 ASSESSMENT — ENCOUNTER SYMPTOMS
NAUSEA: 0
CHEST TIGHTNESS: 0
VOMITING: 0
WHEEZING: 0
ABDOMINAL PAIN: 0
SORE THROAT: 0
DIARRHEA: 0
COUGH: 0
SHORTNESS OF BREATH: 1

## 2024-09-14 ASSESSMENT — PAIN SCALES - GENERAL: PAINLEVEL_OUTOF10: 0

## 2024-09-15 LAB
EKG ATRIAL RATE: 0 BPM
EKG DIAGNOSIS: NORMAL
EKG P AXIS: -1 DEGREES
EKG P-R INTERVAL: 224 MS
EKG Q-T INTERVAL: 448 MS
EKG QRS DURATION: 111 MS
EKG QTC CALCULATION (BAZETT): 487 MS
EKG R AXIS: -29 DEGREES
EKG T AXIS: 16 DEGREES
EKG VENTRICULAR RATE: 71 BPM

## 2024-09-16 PROCEDURE — 93010 ELECTROCARDIOGRAM REPORT: CPT | Performed by: INTERNAL MEDICINE

## 2024-10-01 ENCOUNTER — OFFICE VISIT (OUTPATIENT)
Age: 84
End: 2024-10-01

## 2024-10-01 VITALS
SYSTOLIC BLOOD PRESSURE: 100 MMHG | HEART RATE: 82 BPM | BODY MASS INDEX: 25.78 KG/M2 | DIASTOLIC BLOOD PRESSURE: 64 MMHG | HEIGHT: 64 IN | OXYGEN SATURATION: 99 % | WEIGHT: 151 LBS

## 2024-10-01 DIAGNOSIS — I34.1 MITRAL VALVE PROLAPSE: ICD-10-CM

## 2024-10-01 DIAGNOSIS — I10 ESSENTIAL HYPERTENSION: Primary | ICD-10-CM

## 2024-10-01 DIAGNOSIS — R00.0 TACHYCARDIA: ICD-10-CM

## 2024-10-01 RX ORDER — DILTIAZEM HCL 60 MG
60 TABLET ORAL 2 TIMES DAILY
Qty: 60 TABLET | Refills: 3 | Status: SHIPPED | OUTPATIENT
Start: 2024-10-01

## 2024-10-01 ASSESSMENT — ENCOUNTER SYMPTOMS
EYE PAIN: 0
ABDOMINAL PAIN: 0
COUGH: 0
STRIDOR: 0
APHONIA: 0
NAIL CHANGES: 0

## 2024-10-01 NOTE — PROGRESS NOTES
09/14/24  2:46 PM   Result Value Ref Range    Sodium 142 133 - 143 mmol/L    Potassium 3.8 3.5 - 5.1 mmol/L    Chloride 104 98 - 107 mmol/L    CO2 26 21 - 32 mmol/L    Anion Gap 12 9 - 18 mmol/L    Glucose 163 (H) 65 - 100 mg/dL    BUN 15 8 - 23 MG/DL    Creatinine 0.85 0.6 - 1.0 MG/DL    Est, Glom Filt Rate 68 >60 ml/min/1.73m2    Calcium 9.5 8.3 - 10.4 MG/DL    Total Bilirubin 0.5 0.2 - 1.1 MG/DL    ALT 10 (L) 13.0 - 61.0 U/L    AST 20 15 - 37 U/L    Alk Phosphatase 58 45.0 - 117.0 U/L    Total Protein 6.5 6.4 - 8.2 g/dL    Albumin 4.0 3.2 - 4.6 g/dL    Globulin 2.5 (L) 2.8 - 4.5 g/dL    Albumin/Globulin Ratio 1.6 0.4 - 1.6     Troponin now then q90 min for 2 occurances    Collection Time: 09/14/24  2:46 PM   Result Value Ref Range    Troponin T 16.8 (H) 0 - 14 ng/L   Troponin now then q90 min for 2 occurances    Collection Time: 09/14/24  4:22 PM   Result Value Ref Range    Troponin T 19.6 (H) 0 - 14 ng/L   EKG 12 Lead    Collection Time: 09/14/24  4:53 PM   Result Value Ref Range    Ventricular Rate 71 BPM    Atrial Rate 0 BPM    P-R Interval 224 ms    QRS Duration 111 ms    Q-T Interval 448 ms    QTc Calculation (Bazett) 487 ms    P Axis -1 degrees    R Axis -29 degrees    T Axis 16 degrees    Diagnosis       Sinus rhythm with sinus arrhythmia  Prolonged NE interval  Borderline left axis deviation  Low voltage, precordial leads  Borderline T abnormalities, anterior leads  Borderline prolonged QT interval    Confirmed by MD LIANNE, CLARIBEL ([id number]) on 9/16/2024 7:08:17 AM         No results found for: \"CHOL\", \"CHOLPOCT\", \"CHLST\", \"CHOLV\", \"HDL\", \"HDLPOC\", \"HDLC\", \"LDL\", \"LDLC\", \"VLDLC\", \"VLDL\"    No results found for any visits on 10/01/24.      RAYMUNDO ATKINS was seen today for follow-up from hospital.    Diagnoses and all orders for this visit:    Essential hypertension    Tachycardia    Mitral valve prolapse    Other orders  -     dilTIAZem (CARDIZEM) 60 MG tablet; Take 1 tablet by mouth 2 times

## 2024-10-19 DIAGNOSIS — I82.5Z3 CHRONIC DEEP VEIN THROMBOSIS (DVT) OF DISTAL VEIN OF BOTH LOWER EXTREMITIES (HCC): Primary | ICD-10-CM

## 2024-10-21 RX ORDER — APIXABAN 2.5 MG/1
TABLET, FILM COATED ORAL
Qty: 180 TABLET | Refills: 0 | Status: SHIPPED | OUTPATIENT
Start: 2024-10-21

## 2024-10-31 ENCOUNTER — OFFICE VISIT (OUTPATIENT)
Age: 84
End: 2024-10-31

## 2024-10-31 VITALS
DIASTOLIC BLOOD PRESSURE: 76 MMHG | HEART RATE: 96 BPM | WEIGHT: 154.4 LBS | HEIGHT: 64 IN | BODY MASS INDEX: 26.36 KG/M2 | SYSTOLIC BLOOD PRESSURE: 120 MMHG

## 2024-10-31 DIAGNOSIS — I34.1 MITRAL VALVE PROLAPSE: ICD-10-CM

## 2024-10-31 DIAGNOSIS — I10 ESSENTIAL HYPERTENSION: Primary | ICD-10-CM

## 2024-10-31 DIAGNOSIS — R00.0 TACHYCARDIA: ICD-10-CM

## 2024-10-31 RX ORDER — DILTIAZEM HYDROCHLORIDE 120 MG/1
120 CAPSULE, COATED, EXTENDED RELEASE ORAL DAILY
Qty: 90 CAPSULE | Refills: 3 | Status: SHIPPED | OUTPATIENT
Start: 2024-10-31

## 2024-10-31 NOTE — PROGRESS NOTES
[facility] CARDIOLOGY, [address], [address]  [place] [address]  PHONE: [phone]    SUBJECTIVE:   Sully Harris is a 84 y.o. female 1940   seen for a follow up visit regarding the following:     Chief Complaint   Patient presents with    Follow-up     4 weeks    Hypertension         History of Present Illness  The patient is an 84-year-old female with a history of Supraventricular Tachycardia (SVT), metastatic ER positive/FL negative HER-2 breast cancer, previously prescribed Faslodex and Verzenio by Dr. Sosa in the [facility] system.    She is currently on a regimen of Verzenio, Fosamax, Arimidex, diltiazem 60 mg twice daily, and Eliquis. She reports no recent episodes of palpitations or rapid heart rates, indicating that her condition is well-managed at present. She has not observed any swelling in her legs.        Interval history:  History of SVT status post SVT ablation 2018 additional history of provoked DVT on chronic anticoagulation therapy.  Patient was seen previously for episodes of palpitations and a wearable device was discussed.  Since that time worsening symptoms of palpitations history of MVP      Cardiac History:  2018 ablation of supraventricular tachycardia  2018 echocardiogram normal left ventricular systolic function mitral valve prolapse mild MR  10/31/2022 echocardiogram ejection fraction 55 to 60% mildly increased wall thickness left atrium dilated diastolic dysfunction  10/31/2022 cardiac monitor min 54 Max 200 average 85 bpm 1 run of wide-complex tachycardia lasting 6 beats atrial and ventricular ectopic beats noted  10/2023 Patient had a min HR of 54 bpm, max HR of 200 bpm, and avg HR of 85 bpm.  Predominant underlying rhythm was Sinus Rhythm. 1 run of wide  complex tachycardia occurred lasting 6 beats with a max rate of 179 bpm (avg 150 bpm).Supraventricular Tachycardia runs occurred, the run with the fastest interval lastin7 beats with a max rate of

## 2024-11-04 ENCOUNTER — HOSPITAL ENCOUNTER (EMERGENCY)
Age: 84
Discharge: HOME | End: 2024-11-04
Attending: EMERGENCY MEDICINE
Payer: MEDICARE

## 2024-11-04 VITALS
HEART RATE: 90 BPM | WEIGHT: 155 LBS | OXYGEN SATURATION: 94 % | SYSTOLIC BLOOD PRESSURE: 139 MMHG | RESPIRATION RATE: 16 BRPM | DIASTOLIC BLOOD PRESSURE: 75 MMHG | TEMPERATURE: 98.1 F | BODY MASS INDEX: 26.46 KG/M2 | HEIGHT: 64 IN

## 2024-11-04 DIAGNOSIS — I47.10 PAROXYSMAL SUPRAVENTRICULAR TACHYCARDIA (HCC): Primary | ICD-10-CM

## 2024-11-04 LAB
ANION GAP SERPL CALC-SCNC: 13 MMOL/L (ref 7–16)
BASOPHILS # BLD: 0.1 K/UL (ref 0–0.2)
BASOPHILS NFR BLD: 1 % (ref 0–2)
BUN SERPL-MCNC: 28 MG/DL (ref 8–23)
CALCIUM SERPL-MCNC: 9.8 MG/DL (ref 8.3–10.4)
CHLORIDE SERPL-SCNC: 103 MMOL/L (ref 98–107)
CO2 SERPL-SCNC: 24 MMOL/L (ref 20–29)
CREAT SERPL-MCNC: 0.99 MG/DL (ref 0.6–1)
DIFFERENTIAL METHOD BLD: ABNORMAL
EOSINOPHIL # BLD: 0 K/UL (ref 0–0.8)
EOSINOPHIL NFR BLD: 1 % (ref 0.5–7.8)
ERYTHROCYTE [DISTWIDTH] IN BLOOD BY AUTOMATED COUNT: 15.9 % (ref 11.9–14.6)
GLUCOSE SERPL-MCNC: 165 MG/DL (ref 65–100)
HCT VFR BLD AUTO: 39.6 % (ref 35.8–46.3)
HGB BLD-MCNC: 13.1 G/DL (ref 11.7–15.4)
IMM GRANULOCYTES # BLD AUTO: 0 K/UL (ref 0–0.5)
IMM GRANULOCYTES NFR BLD AUTO: 0 % (ref 0–5)
LYMPHOCYTES # BLD: 1.5 K/UL (ref 0.5–4.6)
LYMPHOCYTES NFR BLD: 33 % (ref 13–44)
MAGNESIUM SERPL-MCNC: 2 MG/DL (ref 1.2–2.6)
MCH RBC QN AUTO: 35.6 PG (ref 26.1–32.9)
MCHC RBC AUTO-ENTMCNC: 33.1 G/DL (ref 31.4–35)
MCV RBC AUTO: 107.6 FL (ref 82–102)
MONOCYTES # BLD: 0.5 K/UL (ref 0.1–1.3)
MONOCYTES NFR BLD: 11 % (ref 4–12)
NEUTS SEG # BLD: 2.5 K/UL (ref 1.7–8.2)
NEUTS SEG NFR BLD: 54 % (ref 43–78)
NRBC # BLD: 0 K/UL (ref 0–0.2)
PLATELET # BLD AUTO: 167 K/UL (ref 150–450)
PMV BLD AUTO: 11 FL (ref 9.4–12.3)
POTASSIUM SERPL-SCNC: 3.6 MMOL/L (ref 3.5–5.1)
RBC # BLD AUTO: 3.68 M/UL (ref 4.05–5.2)
SODIUM SERPL-SCNC: 140 MMOL/L (ref 133–143)
TROPONIN T SERPL HS-MCNC: 22 NG/L (ref 0–14)
WBC # BLD AUTO: 4.6 K/UL (ref 4.3–11.1)

## 2024-11-04 PROCEDURE — 96361 HYDRATE IV INFUSION ADD-ON: CPT

## 2024-11-04 PROCEDURE — 99284 EMERGENCY DEPT VISIT MOD MDM: CPT

## 2024-11-04 PROCEDURE — 6360000002 HC RX W HCPCS

## 2024-11-04 PROCEDURE — 80048 BASIC METABOLIC PNL TOTAL CA: CPT

## 2024-11-04 PROCEDURE — 83735 ASSAY OF MAGNESIUM: CPT

## 2024-11-04 PROCEDURE — 96374 THER/PROPH/DIAG INJ IV PUSH: CPT

## 2024-11-04 PROCEDURE — 94760 N-INVAS EAR/PLS OXIMETRY 1: CPT

## 2024-11-04 PROCEDURE — 2580000003 HC RX 258: Performed by: EMERGENCY MEDICINE

## 2024-11-04 PROCEDURE — 85025 COMPLETE CBC W/AUTO DIFF WBC: CPT

## 2024-11-04 PROCEDURE — 93005 ELECTROCARDIOGRAM TRACING: CPT | Performed by: EMERGENCY MEDICINE

## 2024-11-04 PROCEDURE — 84484 ASSAY OF TROPONIN QUANT: CPT

## 2024-11-04 RX ORDER — ADENOSINE 3 MG/ML
INJECTION, SOLUTION INTRAVENOUS
Status: COMPLETED
Start: 2024-11-04 | End: 2024-11-04

## 2024-11-04 RX ORDER — ADENOSINE 3 MG/ML
6 INJECTION, SOLUTION INTRAVENOUS
Status: COMPLETED | OUTPATIENT
Start: 2024-11-04 | End: 2024-11-04

## 2024-11-04 RX ORDER — 0.9 % SODIUM CHLORIDE 0.9 %
1000 INTRAVENOUS SOLUTION INTRAVENOUS
Status: COMPLETED | OUTPATIENT
Start: 2024-11-04 | End: 2024-11-04

## 2024-11-04 RX ADMIN — SODIUM CHLORIDE 1000 ML: 9 INJECTION, SOLUTION INTRAVENOUS at 17:35

## 2024-11-04 RX ADMIN — ADENOSINE 6 MG: 3 INJECTION, SOLUTION INTRAVENOUS at 17:34

## 2024-11-04 ASSESSMENT — PAIN - FUNCTIONAL ASSESSMENT: PAIN_FUNCTIONAL_ASSESSMENT: NONE - DENIES PAIN

## 2024-11-04 NOTE — ED TRIAGE NOTES
Pt states she has had feeling of \"fast heartbeat\" since about 1545 today, denies pain in chest. States she has had this before. States it is making her feel fatigued.

## 2024-11-04 NOTE — ED PROVIDER NOTES
Brother     Cancer Sister         thyroid    Breast Cancer Paternal Aunt     Cancer Paternal Aunt         stomach        Social History     Socioeconomic History    Marital status:      Spouse name: None    Number of children: None    Years of education: None    Highest education level: None   Tobacco Use    Smoking status: Never     Passive exposure: Yes    Smokeless tobacco: Never   Substance and Sexual Activity    Alcohol use: Yes     Alcohol/week: 1.0 standard drink of alcohol    Drug use: No    Sexual activity: Not Currently     Partners: Male     Social Determinants of Health     Financial Resource Strain: Low Risk  (5/3/2024)    Overall Financial Resource Strain (CARDIA)     Difficulty of Paying Living Expenses: Not hard at all   Food Insecurity: No Food Insecurity (5/3/2024)    Hunger Vital Sign     Worried About Running Out of Food in the Last Year: Never true     Ran Out of Food in the Last Year: Never true   Transportation Needs: Unknown (5/3/2024)    PRAPARE - Transportation     Lack of Transportation (Non-Medical): No   Physical Activity: Sufficiently Active (1/29/2024)    Exercise Vital Sign     Days of Exercise per Week: 3 days     Minutes of Exercise per Session: 90 min   Social Connections: Unknown (3/19/2021)    Received from [employer]    Social Connections     Frequency of Communication with Friends and Family: Not asked     Frequency of Social Gatherings with Friends and Family: Not asked   Intimate Partner Violence: Unknown (3/19/2021)    Received from [employer]    Intimate Partner Violence     Fear of Current or Ex-Partner: Not asked     Emotionally Abused: Not asked     Physically Abused: Not asked     Sexually Abused: Not asked   Housing Stability: Unknown (4/18/2023)    Housing Stability Vital Sign     Unstable Housing in the Last Year: No         Cephalexin     Previous Medications    ABEMACICLIB (VERZENIO) 100 MG TABS    Take 100 mg by mouth

## 2024-11-05 ENCOUNTER — TELEPHONE (OUTPATIENT)
Age: 84
End: 2024-11-05

## 2024-11-05 LAB
EKG ATRIAL RATE: 0 BPM
EKG ATRIAL RATE: 115 BPM
EKG DIAGNOSIS: NORMAL
EKG DIAGNOSIS: NORMAL
EKG P AXIS: 0 DEGREES
EKG P AXIS: 252 DEGREES
EKG P-R INTERVAL: 103 MS
EKG P-R INTERVAL: 151 MS
EKG Q-T INTERVAL: 292 MS
EKG Q-T INTERVAL: 376 MS
EKG QRS DURATION: 96 MS
EKG QRS DURATION: 98 MS
EKG QTC CALCULATION (BAZETT): 477 MS
EKG QTC CALCULATION (BAZETT): 504 MS
EKG R AXIS: -57 DEGREES
EKG R AXIS: -66 DEGREES
EKG T AXIS: 25 DEGREES
EKG T AXIS: 35 DEGREES
EKG VENTRICULAR RATE: 108 BPM
EKG VENTRICULAR RATE: 160 BPM

## 2024-11-05 PROCEDURE — 93010 ELECTROCARDIOGRAM REPORT: CPT | Performed by: INTERNAL MEDICINE

## 2024-11-05 NOTE — ED NOTES
Patient mobility status  with no difficulty. Provider aware     I have reviewed discharge instructions with the patient.  The patient verbalized understanding.    Patient left ED via Discharge Method: ambulatory to Home.    Opportunity for questions and clarification provided.     Patient given 0 scripts.

## 2024-11-05 NOTE — TELEPHONE ENCOUNTER
Pt Is calling wanting to let Dr Quan know that she had to go to the ER in [place] yesterday  For rapid heart rate.She said he should be able to see notes in my chart the patient  wants to make sure Dr Quan is aware of this . Please call pt and let her know that Dr quan was [phone]

## 2024-11-05 NOTE — TELEPHONE ENCOUNTER
Blanca    Can you please make an appointment with Dr. Bains?  He has seen this patient in the past and she is having recurrent SVT.

## 2024-11-20 ENCOUNTER — OFFICE VISIT (OUTPATIENT)
Dept: INTERNAL MEDICINE CLINIC | Facility: CLINIC | Age: 84
End: 2024-11-20

## 2024-11-20 VITALS
HEIGHT: 64 IN | SYSTOLIC BLOOD PRESSURE: 124 MMHG | DIASTOLIC BLOOD PRESSURE: 82 MMHG | BODY MASS INDEX: 25.88 KG/M2 | WEIGHT: 151.6 LBS

## 2024-11-20 DIAGNOSIS — I47.10 SVT (SUPRAVENTRICULAR TACHYCARDIA) (HCC): Primary | ICD-10-CM

## 2024-11-20 DIAGNOSIS — C50.812 MALIGNANT NEOPLASM OF OVERLAPPING SITES OF LEFT FEMALE BREAST, UNSPECIFIED ESTROGEN RECEPTOR STATUS (HCC): ICD-10-CM

## 2024-11-20 DIAGNOSIS — I10 ESSENTIAL HYPERTENSION: ICD-10-CM

## 2024-11-20 RX ORDER — CARVEDILOL 3.12 MG/1
3.12 TABLET ORAL 2 TIMES DAILY
Qty: 180 TABLET | Refills: 3 | Status: SHIPPED | OUTPATIENT
Start: 2024-11-20

## 2024-11-20 SDOH — ECONOMIC STABILITY: FOOD INSECURITY: WITHIN THE PAST 12 MONTHS, THE FOOD YOU BOUGHT JUST DIDN'T LAST AND YOU DIDN'T HAVE MONEY TO GET MORE.: NEVER TRUE

## 2024-11-20 SDOH — ECONOMIC STABILITY: FOOD INSECURITY: WITHIN THE PAST 12 MONTHS, YOU WORRIED THAT YOUR FOOD WOULD RUN OUT BEFORE YOU GOT MONEY TO BUY MORE.: NEVER TRUE

## 2024-11-20 SDOH — ECONOMIC STABILITY: INCOME INSECURITY: HOW HARD IS IT FOR YOU TO PAY FOR THE VERY BASICS LIKE FOOD, HOUSING, MEDICAL CARE, AND HEATING?: NOT HARD AT ALL

## 2024-11-20 NOTE — PROGRESS NOTES
HPI: Sully Harris (: 1940)    Had a prolonged episode of SVT this am and felt weak but not lightheaded or dizzy  Just knows her heart rate is elevated    Also wants flu vaccine      Problem List:  Patient Active Problem List   Diagnosis   • Osteopenia   • Glaucoma   • History of left breast cancer   • History of right breast cancer   • Abnormal EKG   • Chronic deep vein thrombosis (DVT) of distal vein of both lower extremities (HCC)   • Essential hypertension   • Other primary thrombophilia (HCC)   • Malignant neoplasm of overlapping sites of left female breast, unspecified estrogen receptor status (HCC)   • Hx of supraventricular tachycardia   • Diverticulosis   • Hiatal hernia       History:  Past Medical History:   Diagnosis Date   • Arthritis    • Cancer (HCC)     breast cancer x 2   • Diverticulosis    • DVT of lower extremity (deep venous thrombosis) (HCC)     left post op 1996-long term tx with Eliquis   • Essential hypertension 3/18/2019   • Glaucoma 2018    Revision eye care   • Hearing loss in left ear    • History of right breast cancer    • History of supraventricular tachycardia 2018    s/p Ablation   • Hx of bone density study 2020    osteopenia   • Hx of breast cancer 2016    left   • Hx of complete eye exam 2019    due every 3mos- next appt May 7- Dr. Mayberry   • Hx of echocardiogram 2103    mild MVP,mild LAE, EF of 58%   • Osteopenia 09/10/2018    by Bone Density       Allergies:  Allergies   Allergen Reactions   • Cephalexin Rash       Current Medications:  Current Outpatient Medications   Medication Sig Dispense Refill   • carvedilol (COREG) 3.125 MG tablet Take 1 tablet by mouth 2 times daily 180 tablet 3   • dilTIAZem (CARDIZEM CD) 120 MG extended release capsule Take 1 capsule by mouth daily 90 capsule 3   • ELIQUIS 2.5 MG TABS tablet Take 1 tablet by mouth twice daily 180 tablet 0   • alendronate (FOSAMAX) 70 MG tablet Take 1 tablet by mouth once a week 12 tablet

## 2024-11-21 ENCOUNTER — TELEPHONE (OUTPATIENT)
Age: 84
End: 2024-11-21

## 2024-11-21 ENCOUNTER — TELEPHONE (OUTPATIENT)
Dept: INTERNAL MEDICINE CLINIC | Facility: CLINIC | Age: 84
End: 2024-11-21

## 2024-11-21 NOTE — TELEPHONE ENCOUNTER
Spoke to patient.    Reports SVT episode. Started @ 9:15AM and still in it. Current -160. /75    Reports feeling fine with No symptoms.     Currently takes Diltiazem 60mg 1 tablet daily. Dr Junior's last note states patient should take 60mg BID if she can't afford the 120 mg ER tablets    Patient states she is only taking 60mg 1 tablet daily. States she thinks her PCP told her to take it once a day. Has been taking 1 tab daily for approx 3-4 weeks.    Also takes Carvedilol 3.125mg BID & Eliquis 5mg qd.

## 2024-11-21 NOTE — TELEPHONE ENCOUNTER
Per Dr Bains note on 11/05/24 says to schedule OV with him. I spoke to Blanca Mota RN. She said to work patient in tomorrow. Appt schedule @ 9:45am

## 2024-11-21 NOTE — TELEPHONE ENCOUNTER
Pt stated she having fast heart beat this morning 9:15 amd still going on right now   want the pt to take dilTIAZem  60mg once a day,  carvedilol 3.125 mg     Pt will like a call back

## 2024-11-21 NOTE — TELEPHONE ENCOUNTER
Pt called and is having the elevated heart rate again this morning. Pt was seen in the office on 11/20/24 for this and was prescribed Coreg 3.125 mg twice a day. I did explain to pt to continue the Coreg  along with the Diltiazem. Advised pt to also let Cardiology know.

## 2024-11-21 NOTE — TELEPHONE ENCOUNTER
I called the patient back with appointment. She states she thinks she is out of SVT now. States this was the 2nd episode since yesterday.     She agrees to appointment tomorrow at 9:45.       3:34PM- I called the patient back to see how she was feeling. She states she is currently not in SVT.    /76. States earlier around 1:00, BP was 80/60 but said she felt fine. No symptoms.  States she still feels fine.  Confirmed appt for tomorrow. Advised patient to go to ER if SVT returns or if she becomes symptomatic. Patient agreed.

## 2024-11-22 ENCOUNTER — OFFICE VISIT (OUTPATIENT)
Age: 84
End: 2024-11-22

## 2024-11-22 VITALS
SYSTOLIC BLOOD PRESSURE: 134 MMHG | HEIGHT: 64 IN | HEART RATE: 98 BPM | BODY MASS INDEX: 25.04 KG/M2 | WEIGHT: 146.7 LBS | DIASTOLIC BLOOD PRESSURE: 78 MMHG

## 2024-11-22 DIAGNOSIS — I47.10 SVT (SUPRAVENTRICULAR TACHYCARDIA) (HCC): Primary | ICD-10-CM

## 2024-11-22 RX ORDER — DILTIAZEM HCL 60 MG
60 TABLET ORAL DAILY
COMMUNITY

## 2024-11-22 RX ORDER — MUPIROCIN 20 MG/G
OINTMENT TOPICAL
COMMUNITY
Start: 2024-11-19

## 2024-11-22 NOTE — PROGRESS NOTES
Substance Use Topics    Alcohol use: Yes     Alcohol/week: 1.0 standard drink of alcohol       PHYSICAL EXAM:   /78   Pulse 98   Ht 1.626 m (5' 4\")   Wt 66.5 kg (146 lb 11.2 oz)   BMI 25.18 kg/m²      Wt Readings from Last 3 Encounters:   11/22/24 66.5 kg (146 lb 11.2 oz)   11/20/24 68.8 kg (151 lb 9.6 oz)   11/04/24 70.3 kg (155 lb)     BP Readings from Last 3 Encounters:   11/22/24 134/78   11/20/24 124/82   11/04/24 139/75     Gen: well appearing, well developed, NAD  Eyes: Pupils equal, EOMI  CV: RRR, no M/R/G, normal JVD, normal distal pulses, no CATARINO  Pulm: CTAB, no accessory muscle uses, no wheezes, crackles  GI: soft, NT, ND  Neuro: Alert and oriented    Medical problems and test results were reviewed with the patient today.     No results found for any visits on 11/22/24.  Lab Results   Component Value Date    WBC 4.6 11/04/2024    HGB 13.1 11/04/2024    HCT 39.6 11/04/2024    .6 (H) 11/04/2024     11/04/2024     Lab Results   Component Value Date     11/04/2024    K 3.6 11/04/2024     11/04/2024    CO2 24 11/04/2024    BUN 28 (H) 11/04/2024    CREATININE 0.99 11/04/2024    GLUCOSE 165 (H) 11/04/2024    CALCIUM 9.8 11/04/2024    BILITOT 0.5 09/14/2024    ALKPHOS 58 09/14/2024    AST 20 09/14/2024    ALT 10 (L) 09/14/2024    LABGLOM 57 (L) 11/04/2024    GFRAA 101 09/22/2021    AGRATIO 2.1 09/22/2021    GLOB 2.5 (L) 09/14/2024     No results found for: \"TSH\", \"J0WJPNH\", \"THYROIDAB\", \"FT3\", \"T4FREE\"    Lab Results   Component Value Date/Time    MG 2.0 11/04/2024 05:39 PM     No results found for: \"CHOL\", \"TRIG\", \"HDL\", \"LDL\", \"VLDL\", \"CHOLHDLRATIO\"    EKG:  (EKG has been independently visualized by me with interpretation below)  Sinus Rhythm   Low voltage in precordial leads.  -Old anterior infarct.      Sully ATKINS was seen today for svt.    Diagnoses and all orders for this visit:    SVT (supraventricular tachycardia) (HCC)  -     EKG 12 lead      ASSESSMENT and

## 2024-11-30 RX ORDER — DILTIAZEM HCL 60 MG
60 TABLET ORAL 2 TIMES DAILY
Qty: 60 TABLET | Refills: 2 | Status: SHIPPED | OUTPATIENT
Start: 2024-11-30

## 2025-01-06 ENCOUNTER — OFFICE VISIT (OUTPATIENT)
Dept: INTERNAL MEDICINE CLINIC | Facility: CLINIC | Age: 85
End: 2025-01-06
Payer: MEDICARE

## 2025-01-06 VITALS
BODY MASS INDEX: 26.87 KG/M2 | SYSTOLIC BLOOD PRESSURE: 136 MMHG | DIASTOLIC BLOOD PRESSURE: 70 MMHG | WEIGHT: 157.4 LBS | HEIGHT: 64 IN

## 2025-01-06 DIAGNOSIS — M79.89 SWELLING OF EXTREMITY: ICD-10-CM

## 2025-01-06 DIAGNOSIS — I82.512 CHRONIC DEEP VEIN THROMBOSIS (DVT) OF FEMORAL VEIN OF LEFT LOWER EXTREMITY (HCC): ICD-10-CM

## 2025-01-06 DIAGNOSIS — M79.605 LEFT LEG PAIN: ICD-10-CM

## 2025-01-06 DIAGNOSIS — I10 ESSENTIAL HYPERTENSION: ICD-10-CM

## 2025-01-06 DIAGNOSIS — C50.812 MALIGNANT NEOPLASM OF OVERLAPPING SITES OF LEFT FEMALE BREAST, UNSPECIFIED ESTROGEN RECEPTOR STATUS (HCC): Primary | ICD-10-CM

## 2025-01-06 PROCEDURE — 3078F DIAST BP <80 MM HG: CPT | Performed by: INTERNAL MEDICINE

## 2025-01-06 PROCEDURE — 1160F RVW MEDS BY RX/DR IN RCRD: CPT | Performed by: INTERNAL MEDICINE

## 2025-01-06 PROCEDURE — G8417 CALC BMI ABV UP PARAM F/U: HCPCS | Performed by: INTERNAL MEDICINE

## 2025-01-06 PROCEDURE — G8400 PT W/DXA NO RESULTS DOC: HCPCS | Performed by: INTERNAL MEDICINE

## 2025-01-06 PROCEDURE — 1036F TOBACCO NON-USER: CPT | Performed by: INTERNAL MEDICINE

## 2025-01-06 PROCEDURE — 1090F PRES/ABSN URINE INCON ASSESS: CPT | Performed by: INTERNAL MEDICINE

## 2025-01-06 PROCEDURE — 99214 OFFICE O/P EST MOD 30 MIN: CPT | Performed by: INTERNAL MEDICINE

## 2025-01-06 PROCEDURE — 3075F SYST BP GE 130 - 139MM HG: CPT | Performed by: INTERNAL MEDICINE

## 2025-01-06 PROCEDURE — 1123F ACP DISCUSS/DSCN MKR DOCD: CPT | Performed by: INTERNAL MEDICINE

## 2025-01-06 PROCEDURE — G8427 DOCREV CUR MEDS BY ELIG CLIN: HCPCS | Performed by: INTERNAL MEDICINE

## 2025-01-06 PROCEDURE — 1159F MED LIST DOCD IN RCRD: CPT | Performed by: INTERNAL MEDICINE

## 2025-01-06 RX ORDER — FUROSEMIDE 20 MG/1
20 TABLET ORAL DAILY PRN
Qty: 30 TABLET | Refills: 1 | Status: SHIPPED | OUTPATIENT
Start: 2025-01-06

## 2025-01-06 ASSESSMENT — PATIENT HEALTH QUESTIONNAIRE - PHQ9
SUM OF ALL RESPONSES TO PHQ QUESTIONS 1-9: 0
SUM OF ALL RESPONSES TO PHQ9 QUESTIONS 1 & 2: 0
1. LITTLE INTEREST OR PLEASURE IN DOING THINGS: NOT AT ALL
SUM OF ALL RESPONSES TO PHQ QUESTIONS 1-9: 0
SUM OF ALL RESPONSES TO PHQ QUESTIONS 1-9: 0
2. FEELING DOWN, DEPRESSED OR HOPELESS: NOT AT ALL
SUM OF ALL RESPONSES TO PHQ QUESTIONS 1-9: 0

## 2025-01-06 ASSESSMENT — ENCOUNTER SYMPTOMS: SHORTNESS OF BREATH: 0

## 2025-01-06 NOTE — PROGRESS NOTES
HPI: Sully Harris (: 1940)    Had ultrasound that confirmed Chronic DVT on the left - femoral and states she feels like her left arm is also swelling  Has been on Eliquis but could have more extensive clot and is also on medication for breast cancer     She states the medication is helping her SVT and heart rate    Did have labs recently with oncology and reviewed with pt  Problem List:  Patient Active Problem List   Diagnosis    Osteopenia    Glaucoma    History of left breast cancer    History of right breast cancer    Abnormal EKG    Chronic deep vein thrombosis (DVT) of distal vein of both lower extremities (HCC)    Essential hypertension    Other primary thrombophilia (HCC)    Malignant neoplasm of overlapping sites of left female breast, unspecified estrogen receptor status (HCC)    Hx of supraventricular tachycardia    Diverticulosis    Hiatal hernia       History:  Past Medical History:   Diagnosis Date    Arthritis     Cancer (HCC)     breast cancer x 2    Diverticulosis     DVT of lower extremity (deep venous thrombosis) (HCC)     left post op -long term tx with Eliquis    Essential hypertension 3/18/2019    Glaucoma 2018    Revision eye care    Hearing loss in left ear     History of right breast cancer     History of supraventricular tachycardia 2018    s/p Ablation    Hx of bone density study 2020    osteopenia    Hx of breast cancer 2016    left    Hx of complete eye exam 2019    due every 3mos- next appt May 7- Dr. Mayberry    Hx of echocardiogram 2103    mild MVP,mild LAE, EF of 58%    Osteopenia 09/10/2018    by Bone Density       Allergies:  Allergies   Allergen Reactions    Cephalexin Rash       Current Medications:  Current Outpatient Medications   Medication Sig Dispense Refill    furosemide (LASIX) 20 MG tablet Take 1 tablet by mouth daily as needed (for fluid retention) 30 tablet 1    dilTIAZem (CARDIZEM) 60 MG tablet Take 1 tablet by mouth in the morning and

## 2025-01-28 DIAGNOSIS — I82.5Z3 CHRONIC DEEP VEIN THROMBOSIS (DVT) OF DISTAL VEIN OF BOTH LOWER EXTREMITIES (HCC): ICD-10-CM

## 2025-01-28 RX ORDER — APIXABAN 2.5 MG/1
TABLET, FILM COATED ORAL
Qty: 180 TABLET | Refills: 2 | Status: SHIPPED | OUTPATIENT
Start: 2025-01-28

## 2025-02-24 RX ORDER — FUROSEMIDE 20 MG/1
TABLET ORAL
Qty: 30 TABLET | Refills: 5 | Status: SHIPPED | OUTPATIENT
Start: 2025-02-24

## 2025-03-17 ENCOUNTER — TELEPHONE (OUTPATIENT)
Dept: INTERNAL MEDICINE CLINIC | Facility: CLINIC | Age: 85
End: 2025-03-17

## 2025-03-17 ENCOUNTER — TELEPHONE (OUTPATIENT)
Age: 85
End: 2025-03-17

## 2025-03-17 ENCOUNTER — HOSPITAL ENCOUNTER (EMERGENCY)
Age: 85
Discharge: HOME | End: 2025-03-17
Attending: EMERGENCY MEDICINE
Payer: MEDICARE

## 2025-03-17 VITALS
SYSTOLIC BLOOD PRESSURE: 120 MMHG | OXYGEN SATURATION: 95 % | HEART RATE: 79 BPM | BODY MASS INDEX: 25.95 KG/M2 | TEMPERATURE: 98.6 F | HEIGHT: 64 IN | WEIGHT: 152 LBS | DIASTOLIC BLOOD PRESSURE: 81 MMHG | RESPIRATION RATE: 18 BRPM

## 2025-03-17 DIAGNOSIS — I47.10 PAROXYSMAL SUPRAVENTRICULAR TACHYCARDIA: Primary | ICD-10-CM

## 2025-03-17 LAB
ANION GAP SERPL CALC-SCNC: 12 MMOL/L (ref 7–16)
BASOPHILS # BLD: 0.07 K/UL (ref 0–0.2)
BASOPHILS NFR BLD: 1.6 % (ref 0–2)
BUN SERPL-MCNC: 13 MG/DL (ref 8–23)
CALCIUM SERPL-MCNC: 9.2 MG/DL (ref 8.8–10.2)
CHLORIDE SERPL-SCNC: 103 MMOL/L (ref 98–107)
CO2 SERPL-SCNC: 26 MMOL/L (ref 20–29)
CREAT SERPL-MCNC: 0.9 MG/DL (ref 0.8–1.3)
DIFFERENTIAL METHOD BLD: ABNORMAL
EOSINOPHIL # BLD: 0.09 K/UL (ref 0–0.8)
EOSINOPHIL NFR BLD: 2 % (ref 0.5–7.8)
ERYTHROCYTE [DISTWIDTH] IN BLOOD BY AUTOMATED COUNT: 15 % (ref 11.9–14.6)
GLUCOSE SERPL-MCNC: 162 MG/DL (ref 65–100)
HCT VFR BLD AUTO: 37.4 % (ref 35.8–46.3)
HGB BLD-MCNC: 12.1 G/DL (ref 11.7–15.4)
IMM GRANULOCYTES # BLD AUTO: 0.01 K/UL (ref 0–0.5)
IMM GRANULOCYTES NFR BLD AUTO: 0.2 % (ref 0–5)
LYMPHOCYTES # BLD: 1.28 K/UL (ref 0.5–4.6)
LYMPHOCYTES NFR BLD: 28.9 % (ref 13–44)
MAGNESIUM SERPL-MCNC: 2.1 MG/DL (ref 1.8–2.4)
MCH RBC QN AUTO: 35.5 PG (ref 26.1–32.9)
MCHC RBC AUTO-ENTMCNC: 32.4 G/DL (ref 31.4–35)
MCV RBC AUTO: 109.7 FL (ref 82–102)
MONOCYTES # BLD: 0.42 K/UL (ref 0.1–1.3)
MONOCYTES NFR BLD: 9.5 % (ref 4–12)
NEUTS SEG # BLD: 2.56 K/UL (ref 1.7–8.2)
NEUTS SEG NFR BLD: 57.8 % (ref 43–78)
NRBC # BLD: 0 K/UL (ref 0–0.2)
PLATELET # BLD AUTO: 225 K/UL (ref 150–450)
PMV BLD AUTO: 11.7 FL (ref 9.4–12.3)
POTASSIUM SERPL-SCNC: 4.4 MMOL/L (ref 3.5–5.1)
RBC # BLD AUTO: 3.41 M/UL (ref 4.05–5.2)
SODIUM SERPL-SCNC: 141 MMOL/L (ref 133–143)
WBC # BLD AUTO: 4.4 K/UL (ref 4.3–11.1)

## 2025-03-17 PROCEDURE — 83735 ASSAY OF MAGNESIUM: CPT

## 2025-03-17 PROCEDURE — 2580000003 HC RX 258: Performed by: EMERGENCY MEDICINE

## 2025-03-17 PROCEDURE — 85025 COMPLETE CBC W/AUTO DIFF WBC: CPT

## 2025-03-17 PROCEDURE — 96374 THER/PROPH/DIAG INJ IV PUSH: CPT

## 2025-03-17 PROCEDURE — 99284 EMERGENCY DEPT VISIT MOD MDM: CPT

## 2025-03-17 PROCEDURE — 80048 BASIC METABOLIC PNL TOTAL CA: CPT

## 2025-03-17 PROCEDURE — 2500000003 HC RX 250 WO HCPCS: Performed by: EMERGENCY MEDICINE

## 2025-03-17 PROCEDURE — 96361 HYDRATE IV INFUSION ADD-ON: CPT

## 2025-03-17 PROCEDURE — 93005 ELECTROCARDIOGRAM TRACING: CPT | Performed by: EMERGENCY MEDICINE

## 2025-03-17 RX ORDER — SODIUM CHLORIDE, SODIUM LACTATE, POTASSIUM CHLORIDE, AND CALCIUM CHLORIDE .6; .31; .03; .02 G/100ML; G/100ML; G/100ML; G/100ML
1000 INJECTION, SOLUTION INTRAVENOUS
Status: COMPLETED | OUTPATIENT
Start: 2025-03-17 | End: 2025-03-17

## 2025-03-17 RX ORDER — DILTIAZEM HYDROCHLORIDE 5 MG/ML
10 INJECTION INTRAVENOUS
Status: COMPLETED | OUTPATIENT
Start: 2025-03-17 | End: 2025-03-17

## 2025-03-17 RX ORDER — DILTIAZEM HCL 60 MG
60 TABLET ORAL 2 TIMES DAILY
Qty: 180 TABLET | Refills: 3 | Status: SHIPPED | OUTPATIENT
Start: 2025-03-17

## 2025-03-17 RX ORDER — DILTIAZEM HYDROCHLORIDE 5 MG/ML
20 INJECTION INTRAVENOUS
Status: DISCONTINUED | OUTPATIENT
Start: 2025-03-17 | End: 2025-03-17

## 2025-03-17 RX ADMIN — SODIUM CHLORIDE, POTASSIUM CHLORIDE, SODIUM LACTATE AND CALCIUM CHLORIDE 1000 ML: 600; 310; 30; 20 INJECTION, SOLUTION INTRAVENOUS at 15:27

## 2025-03-17 RX ADMIN — DILTIAZEM HYDROCHLORIDE 10 MG: 5 INJECTION, SOLUTION INTRAVENOUS at 15:30

## 2025-03-17 ASSESSMENT — ENCOUNTER SYMPTOMS
FACIAL SWELLING: 0
SORE THROAT: 0
BACK PAIN: 0
NAUSEA: 0
ABDOMINAL PAIN: 0
CHEST TIGHTNESS: 0
SHORTNESS OF BREATH: 0
VOICE CHANGE: 0
TROUBLE SWALLOWING: 0
VOMITING: 0
EYE PAIN: 0
COUGH: 0

## 2025-03-17 ASSESSMENT — PAIN - FUNCTIONAL ASSESSMENT
PAIN_FUNCTIONAL_ASSESSMENT: NONE - DENIES PAIN
PAIN_FUNCTIONAL_ASSESSMENT: NONE - DENIES PAIN

## 2025-03-17 ASSESSMENT — PAIN SCALES - GENERAL: PAINLEVEL_OUTOF10: 0

## 2025-03-17 NOTE — TELEPHONE ENCOUNTER
Pt call with c/o increased heart rate. She had this episode on Friday evening that lasted for about 4 hours. Also having the episode again this morning. Pt was advised to call cardiology for an evaluation.

## 2025-03-17 NOTE — TELEPHONE ENCOUNTER
Patient reports she has been running a high heart rate for several days. Episode on Friday lasted 4 hours. Today HR running 120's or greater since 9 am. Patient reports she is feeling really weak from HR running so fast. Patient reports she has been out of Diltiazem for at least 2 weeks. Advised patient to go to ER to get HR down. Will send in a new script for diltiazem.

## 2025-03-17 NOTE — ED TRIAGE NOTES
Patient ambulatory to triage. Pt states her HR has been over 100 since this 0910 this morning. Pt states this happened on Friday night at well and did not seek medical attention at that time. Denies CP and SOB. Patient has addition hx for which she has been treated for this.

## 2025-03-17 NOTE — DISCHARGE INSTRUCTIONS
I recommend picking up your diltiazem and restarting the medication as prescribed.  Please follow-up with your cardiologist as needed.  Please return to the ER for any acutely worsening symptoms.

## 2025-03-17 NOTE — ED PROVIDER NOTES
Emergency Department Provider Note       PCP: Sandee Lema MD   Age: 84 y.o.   Sex: female     DISPOSITION Decision To Discharge 03/17/2025 04:49:53 PM    ICD-10-CM    1. Paroxysmal supraventricular tachycardia  I47.10           Medical Decision Making     Patient presents for tachycardia.  Vital signs here reviewed.  Patient is has SVT with a rate in the 170s.  While we are obtaining an IV.  Patient vague old and had gone down to a heart rate in the low 100s showing underlying A-fib.  Will go ahead and obtain basic blood work.  Patient was given small dose of 10 mg of Cardizem.  CBC, CMP here are obtained.  No electrolyte abnormalities.  After the patient's 10 of Cardizem her heart rate did convert back to normal sinus rhythm with a rate in the 70s.  She continues to be symptom-free here in the emergency department.  Patient already has a prescription sent in by the cardiologist.  She states that she will go pick this up.  Patient will be given return precautions to the ER.  Patient is stable on discharge cardiac examination     1 acute complicated illness or injury.  Shared medical decision making was utilized in creating the patients health plan today.  I independently ordered and reviewed each unique test.    I reviewed external records: ED visit note from a different ED.   I reviewed external records: provider visit note from PCP.  I reviewed external records: provider visit note from outside specialist.     ED cardiac monitoring rhythm strip was ordered and interpreted:  atrial fibrillation with rapid ventricular rate  ST Segments:Normal ST segments - NO STEMI   Rate:     ED provider's independent EKG interpretation SVT with ventricular rate of 170 bpm            History     84-year-old female presents to the ER with via private vehicle with chief complaint of high heart rate.  Patient reports she has been out of her diltiazem for the past 2 weeks in duration.  She called her cardiologist.  They

## 2025-03-17 NOTE — TELEPHONE ENCOUNTER
Moncho Aceves MD Bennett, Sherry, RN  Caller: Unspecified (Today, 11:37 AM)  Diltiazem controls her heart rate.  When you stop taking it is not unusual to your heart rate go up.  Will resume taking the medication and always call for refills before you are out.    Moncho Aceves MD

## 2025-03-17 NOTE — TELEPHONE ENCOUNTER
Pt call with c/o increased heart rate. She had this episode on Friday evening that lasted for about 4 hours   Pt stated she have another episode today at 9am its still going on

## 2025-03-18 LAB
EKG ATRIAL RATE: 164 BPM
EKG DIAGNOSIS: NORMAL
EKG P AXIS: 0 DEGREES
EKG Q-T INTERVAL: 311 MS
EKG QRS DURATION: 103 MS
EKG QTC CALCULATION (BAZETT): 511 MS
EKG R AXIS: -39 DEGREES
EKG T AXIS: 37 DEGREES
EKG VENTRICULAR RATE: 162 BPM

## 2025-03-18 PROCEDURE — 93010 ELECTROCARDIOGRAM REPORT: CPT | Performed by: INTERNAL MEDICINE

## 2025-04-14 RX ORDER — ALENDRONATE SODIUM 70 MG/1
70 TABLET ORAL WEEKLY
Qty: 12 TABLET | Refills: 1 | Status: SHIPPED | OUTPATIENT
Start: 2025-04-14

## 2025-05-29 ENCOUNTER — HOSPITAL ENCOUNTER (EMERGENCY)
Age: 85
Discharge: HOME | End: 2025-05-29
Attending: GENERAL PRACTICE
Payer: MEDICARE

## 2025-05-29 ENCOUNTER — CLINICAL SUPPORT (OUTPATIENT)
Dept: INTERNAL MEDICINE CLINIC | Facility: CLINIC | Age: 85
End: 2025-05-29
Payer: MEDICARE

## 2025-05-29 VITALS
SYSTOLIC BLOOD PRESSURE: 110 MMHG | DIASTOLIC BLOOD PRESSURE: 62 MMHG | BODY MASS INDEX: 25.61 KG/M2 | RESPIRATION RATE: 17 BRPM | OXYGEN SATURATION: 94 % | HEART RATE: 71 BPM | TEMPERATURE: 98.8 F | WEIGHT: 150 LBS | HEIGHT: 64 IN

## 2025-05-29 VITALS
OXYGEN SATURATION: 99 % | HEART RATE: 130 BPM | BODY MASS INDEX: 25.61 KG/M2 | SYSTOLIC BLOOD PRESSURE: 110 MMHG | WEIGHT: 150 LBS | HEIGHT: 64 IN | DIASTOLIC BLOOD PRESSURE: 74 MMHG

## 2025-05-29 DIAGNOSIS — I47.10 SVT (SUPRAVENTRICULAR TACHYCARDIA): Primary | ICD-10-CM

## 2025-05-29 DIAGNOSIS — R00.0 TACHYCARDIA: ICD-10-CM

## 2025-05-29 DIAGNOSIS — I47.10 PAROXYSMAL SUPRAVENTRICULAR TACHYCARDIA: Primary | ICD-10-CM

## 2025-05-29 LAB
ALBUMIN SERPL-MCNC: 4.1 G/DL (ref 3.2–4.6)
ALBUMIN/GLOB SERPL: 1.5 (ref 1–1.9)
ALP SERPL-CCNC: 65 U/L (ref 35–104)
ALT SERPL-CCNC: 11 U/L (ref 12–65)
ANION GAP SERPL CALC-SCNC: 10 MMOL/L (ref 7–16)
AST SERPL-CCNC: 21 U/L (ref 15–37)
BASOPHILS # BLD: 0.06 K/UL (ref 0–0.2)
BASOPHILS NFR BLD: 1.4 % (ref 0–2)
BILIRUB SERPL-MCNC: 0.5 MG/DL (ref 0–1.2)
BUN SERPL-MCNC: 19 MG/DL (ref 8–23)
CALCIUM SERPL-MCNC: 10.2 MG/DL (ref 8.8–10.2)
CHLORIDE SERPL-SCNC: 104 MMOL/L (ref 98–107)
CO2 SERPL-SCNC: 29 MMOL/L (ref 20–29)
CREAT SERPL-MCNC: 1.02 MG/DL (ref 0.8–1.3)
DIFFERENTIAL METHOD BLD: ABNORMAL
EKG ATRIAL RATE: 152 BPM
EKG DIAGNOSIS: NORMAL
EKG P AXIS: 70 DEGREES
EKG P-R INTERVAL: 250 MS
EKG Q-T INTERVAL: 301 MS
EKG QRS DURATION: 95 MS
EKG QTC CALCULATION (BAZETT): 478 MS
EKG R AXIS: -52 DEGREES
EKG T AXIS: 72 DEGREES
EKG VENTRICULAR RATE: 151 BPM
EOSINOPHIL # BLD: 0.06 K/UL (ref 0–0.8)
EOSINOPHIL NFR BLD: 1.4 % (ref 0.5–7.8)
ERYTHROCYTE [DISTWIDTH] IN BLOOD BY AUTOMATED COUNT: 15.9 % (ref 11.9–14.6)
GLOBULIN SER CALC-MCNC: 2.7 G/DL (ref 2.3–3.5)
GLUCOSE SERPL-MCNC: 167 MG/DL (ref 65–100)
HCT VFR BLD AUTO: 38.6 % (ref 35.8–46.3)
HGB BLD-MCNC: 12.2 G/DL (ref 11.7–15.4)
IMM GRANULOCYTES # BLD AUTO: 0.01 K/UL (ref 0–0.5)
IMM GRANULOCYTES NFR BLD AUTO: 0.2 % (ref 0–5)
LYMPHOCYTES # BLD: 1.16 K/UL (ref 0.5–4.6)
LYMPHOCYTES NFR BLD: 28 % (ref 13–44)
MAGNESIUM SERPL-MCNC: 1.9 MG/DL (ref 1.8–2.4)
MCH RBC QN AUTO: 34.9 PG (ref 26.1–32.9)
MCHC RBC AUTO-ENTMCNC: 31.6 G/DL (ref 31.4–35)
MCV RBC AUTO: 110.3 FL (ref 82–102)
MONOCYTES # BLD: 0.41 K/UL (ref 0.1–1.3)
MONOCYTES NFR BLD: 9.9 % (ref 4–12)
NEUTS SEG # BLD: 2.45 K/UL (ref 1.7–8.2)
NEUTS SEG NFR BLD: 59.1 % (ref 43–78)
NRBC # BLD: 0 K/UL (ref 0–0.2)
PLATELET # BLD AUTO: 215 K/UL (ref 150–450)
PMV BLD AUTO: 10.3 FL (ref 9.4–12.3)
POTASSIUM SERPL-SCNC: 4.3 MMOL/L (ref 3.5–5.1)
PROT SERPL-MCNC: 6.8 G/DL (ref 6.3–8.2)
RBC # BLD AUTO: 3.5 M/UL (ref 4.05–5.2)
SODIUM SERPL-SCNC: 143 MMOL/L (ref 133–143)
TROPONIN T SERPL HS-MCNC: 20.8 NG/L (ref 0–14)
TROPONIN T SERPL HS-MCNC: 24 NG/L (ref 0–14)
WBC # BLD AUTO: 4.2 K/UL (ref 4.3–11.1)

## 2025-05-29 PROCEDURE — 93005 ELECTROCARDIOGRAM TRACING: CPT | Performed by: GENERAL PRACTICE

## 2025-05-29 PROCEDURE — 3078F DIAST BP <80 MM HG: CPT | Performed by: NURSE PRACTITIONER

## 2025-05-29 PROCEDURE — 3074F SYST BP LT 130 MM HG: CPT | Performed by: NURSE PRACTITIONER

## 2025-05-29 PROCEDURE — 93000 ELECTROCARDIOGRAM COMPLETE: CPT | Performed by: NURSE PRACTITIONER

## 2025-05-29 PROCEDURE — 84484 ASSAY OF TROPONIN QUANT: CPT

## 2025-05-29 PROCEDURE — 99212 OFFICE O/P EST SF 10 MIN: CPT | Performed by: NURSE PRACTITIONER

## 2025-05-29 PROCEDURE — 2580000003 HC RX 258: Performed by: GENERAL PRACTICE

## 2025-05-29 PROCEDURE — 83735 ASSAY OF MAGNESIUM: CPT

## 2025-05-29 PROCEDURE — 93010 ELECTROCARDIOGRAM REPORT: CPT | Performed by: INTERNAL MEDICINE

## 2025-05-29 PROCEDURE — 85025 COMPLETE CBC W/AUTO DIFF WBC: CPT

## 2025-05-29 PROCEDURE — 96374 THER/PROPH/DIAG INJ IV PUSH: CPT

## 2025-05-29 PROCEDURE — 96361 HYDRATE IV INFUSION ADD-ON: CPT

## 2025-05-29 PROCEDURE — 80053 COMPREHEN METABOLIC PANEL: CPT

## 2025-05-29 PROCEDURE — 2500000003 HC RX 250 WO HCPCS: Performed by: GENERAL PRACTICE

## 2025-05-29 PROCEDURE — 99284 EMERGENCY DEPT VISIT MOD MDM: CPT

## 2025-05-29 RX ORDER — 0.9 % SODIUM CHLORIDE 0.9 %
500 INTRAVENOUS SOLUTION INTRAVENOUS ONCE
Status: COMPLETED | OUTPATIENT
Start: 2025-05-29 | End: 2025-05-29

## 2025-05-29 RX ORDER — DILTIAZEM HYDROCHLORIDE 5 MG/ML
10 INJECTION INTRAVENOUS
Status: COMPLETED | OUTPATIENT
Start: 2025-05-29 | End: 2025-05-29

## 2025-05-29 RX ORDER — POTASSIUM CHLORIDE 750 MG/1
10 TABLET, EXTENDED RELEASE ORAL DAILY
COMMUNITY
Start: 2025-05-01

## 2025-05-29 RX ORDER — LATANOPROST 50 UG/ML
1 SOLUTION/ DROPS OPHTHALMIC NIGHTLY
COMMUNITY
Start: 2025-05-15

## 2025-05-29 RX ADMIN — DILTIAZEM HYDROCHLORIDE 10 MG: 5 INJECTION, SOLUTION INTRAVENOUS at 12:48

## 2025-05-29 RX ADMIN — SODIUM CHLORIDE 500 ML: 0.9 INJECTION, SOLUTION INTRAVENOUS at 13:01

## 2025-05-29 SDOH — ECONOMIC STABILITY: FOOD INSECURITY: WITHIN THE PAST 12 MONTHS, THE FOOD YOU BOUGHT JUST DIDN'T LAST AND YOU DIDN'T HAVE MONEY TO GET MORE.: NEVER TRUE

## 2025-05-29 SDOH — ECONOMIC STABILITY: FOOD INSECURITY: WITHIN THE PAST 12 MONTHS, YOU WORRIED THAT YOUR FOOD WOULD RUN OUT BEFORE YOU GOT MONEY TO BUY MORE.: NEVER TRUE

## 2025-05-29 ASSESSMENT — LIFESTYLE VARIABLES
HOW OFTEN DO YOU HAVE A DRINK CONTAINING ALCOHOL: NEVER
HOW MANY STANDARD DRINKS CONTAINING ALCOHOL DO YOU HAVE ON A TYPICAL DAY: PATIENT DOES NOT DRINK

## 2025-05-29 ASSESSMENT — PAIN - FUNCTIONAL ASSESSMENT: PAIN_FUNCTIONAL_ASSESSMENT: 0-10

## 2025-05-29 ASSESSMENT — PAIN SCALES - GENERAL: PAINLEVEL_OUTOF10: 0

## 2025-05-29 NOTE — PROGRESS NOTES
Pt walked in and c/o elevated heart rate. Nursing visit was done on pt and pulse was elevated at 130, /74. Pt is having no other symptoms except fast pulse. EKG complete and does show undetermined tachycardia. Per Julia Paduano, NP, Pt needs to go to the ER for an evaluation.\" Pt instructed to go down to the ER downstairs.

## 2025-05-29 NOTE — ED PROVIDER NOTES
Emergency Department Provider Note       PCP: Sandee Lema MD   Age: 84 y.o.   Sex: female     DISPOSITION Decision To Discharge 05/29/2025 02:51:06 PM   DISPOSITION CONDITION Stable            ICD-10-CM    1. Paroxysmal supraventricular tachycardia  I47.10           Medical Decision Making     Patient presents with likely SVT.  Patient responded to 1 dose of diltiazem and remained in normal sinus rhythm with a rate in the 70s.  Was observed for several hours.  Ambulated and had no return of SVT.  Labs were otherwise normal.  There is nothing to suggest ACS, malignant dysrhythmia, PE, CHF, electrolyte abnormalities, or any other emergent pathology.  Patient to follow-up primary care return precautions given     1 or more acute illnesses that pose a threat to life or bodily function.   Drug therapy given requiring intensive monitoring for toxicity.  Patient was discharged risks and benefits of hospitalization were considered.  Chronic medical problems impacting care include SVT.  Shared medical decision making was utilized in creating the patients health plan today.    I independently ordered and reviewed each unique test.  I reviewed external records: provider visit note from outside specialist.  I reviewed external records: previous lab results from outside ED.  I reviewed external records: previous imaging study including radiologist interpretation.       My Independent EKG Interpretation: supraventricular tachycardia      ST Segments:Normal ST segments - NO STEMI   Rate: 151            History     Patient presents with palpitations and tachycardia.  Patient noticed her heart rate going up around 1130 this morning.  No syncope or near syncope.  No chest pain.  Otherwise nothing else has been off no fevers or cough or shortness of breath.  No vomiting or diarrhea.  Normal appetite.  She has had this problem before and she thinks it is SVT.  She takes diltiazem p.o. and has been compliant.        ROS

## 2025-05-29 NOTE — ED TRIAGE NOTES
Patient ambulatory to triage after being sent by her PCP for tachycardia. Denies any chest pain.  in triage

## 2025-05-30 ENCOUNTER — CARE COORDINATION (OUTPATIENT)
Dept: CARE COORDINATION | Facility: CLINIC | Age: 85
End: 2025-05-30

## 2025-05-30 NOTE — CARE COORDINATION
Ambulatory Care Coordination Note     2025 8:16 AM     Patient Current Location:  [place]     This patient was received as a referral from Ambulatory Care Manager .    ACM contacted the patient by telephone. Verified name and  with patient as identifiers. Provided introduction to self, and explanation of the ACM role.   Patient declined care management services at this time.

## 2025-06-16 DIAGNOSIS — I82.5Z3 CHRONIC DEEP VEIN THROMBOSIS (DVT) OF DISTAL VEIN OF BOTH LOWER EXTREMITIES (HCC): ICD-10-CM

## 2025-06-16 RX ORDER — POTASSIUM CHLORIDE 750 MG/1
10 TABLET, EXTENDED RELEASE ORAL DAILY
Qty: 90 TABLET | Refills: 0 | Status: SHIPPED | OUTPATIENT
Start: 2025-06-16

## 2025-06-16 RX ORDER — FUROSEMIDE 20 MG/1
TABLET ORAL
Qty: 90 TABLET | Refills: 0 | Status: SHIPPED | OUTPATIENT
Start: 2025-06-16

## 2025-06-16 RX ORDER — CARVEDILOL 3.12 MG/1
3.12 TABLET ORAL 2 TIMES DAILY
Qty: 180 TABLET | Refills: 0 | Status: SHIPPED | OUTPATIENT
Start: 2025-06-16